# Patient Record
Sex: MALE | HISPANIC OR LATINO | Employment: OTHER | URBAN - METROPOLITAN AREA
[De-identification: names, ages, dates, MRNs, and addresses within clinical notes are randomized per-mention and may not be internally consistent; named-entity substitution may affect disease eponyms.]

---

## 2017-05-15 ENCOUNTER — OFFICE VISIT (OUTPATIENT)
Dept: RHEUMATOLOGY | Facility: PHYSICIAN GROUP | Age: 62
End: 2017-05-15
Payer: COMMERCIAL

## 2017-05-15 VITALS
TEMPERATURE: 98.6 F | DIASTOLIC BLOOD PRESSURE: 70 MMHG | OXYGEN SATURATION: 94 % | SYSTOLIC BLOOD PRESSURE: 132 MMHG | RESPIRATION RATE: 12 BRPM | BODY MASS INDEX: 37.49 KG/M2 | HEART RATE: 77 BPM | WEIGHT: 254 LBS

## 2017-05-15 DIAGNOSIS — M51.9 LUMBAR DISC DISEASE: ICD-10-CM

## 2017-05-15 DIAGNOSIS — M17.11 PRIMARY OSTEOARTHRITIS OF ONE KNEE, RIGHT: ICD-10-CM

## 2017-05-15 PROCEDURE — 20610 DRAIN/INJ JOINT/BURSA W/O US: CPT | Mod: RT | Performed by: INTERNAL MEDICINE

## 2017-05-15 PROCEDURE — 99214 OFFICE O/P EST MOD 30 MIN: CPT | Mod: 25 | Performed by: INTERNAL MEDICINE

## 2017-05-15 RX ORDER — NABUMETONE 500 MG/1
1000 TABLET, FILM COATED ORAL 2 TIMES DAILY
Qty: 120 TAB | Refills: 2 | Status: SHIPPED | OUTPATIENT
Start: 2017-05-15 | End: 2018-01-23 | Stop reason: SDUPTHER

## 2017-05-15 NOTE — Clinical Note
[facility]   [address], [address]  [place] [address]  Phone: [phone]  Fax: [phone]              Encounter Date: 5/15/2017    Dear Dr. Fajardo ref. provider found,    It was a pleasure seeing your patient, Ray Tesfaye, on 5/15/2017. Diagnoses of Primary osteoarthritis of one knee, right and Lumbar disc disease were pertinent to this visit.     Please find attached progress note which includes the history I obtained from Mr. Tesfaye, my physical examination findings, my impression and recommendations.      Once again, it was a pleasure participating in your patient's care.  Please feel free to contact me if you have any questions or if I can be of any further assistance to your patients.      Sincerely,    Emily Martinez M.D.  Electronically Signed          PROGRESS NOTE:  No notes on file

## 2017-05-15 NOTE — PROGRESS NOTES
"Chief Complaint- joint pain    Subjective:   Ray Tesfaye is a 61 y.o. male here today for follow up of rheumatological issues    This is a follow-up patient to this clinic,  previously followed by Dr Rosales  for osteoarthritis of both knees,  patient here to see if he can get a Synvisc injection in the right knee. Patient states he's had surgery on the left knee with good results and it isn't bothering him as much today. Last Synvisc one injection was July 2016 with excellent results. Comorbidities include low back pain, overweight, GERD.  Patient denies any trauma, denies any falls, denies any injuries sports, working, otherwise.    Uric acid 5.0 12/2014  CCP neg 12/2014  BILLIE neg 12/2014     Current medicines (including changes today)  Current Outpatient Prescriptions   Medication Sig Dispense Refill   • nabumetone (RELAFEN) 500 MG Tab Take 2 Tabs by mouth 2 times a day. 120 Tab 2   • hydrocodone-acetaminophen (NORCO) 5-325 MG Tab per tablet 1 tab po bid prn SEVERE pain, NO ALCOHOL and NO DRIVING  within 24 hrs of taking this medication,NO medications called \"benzodiazepines\", no selling or giving to any other persons. 60 Tab 0   • tamsulosin (FLOMAX) 0.4 MG capsule Take 0.4 mg by mouth ONE-HALF HOUR AFTER BREAKFAST.     • omeprazole (PRILOSEC) 20 MG CPDR Take 1 Cap by mouth 1 time daily as needed. 30 Cap 6   • ATORVASTATIN CALCIUM PO Take  by mouth.       No current facility-administered medications for this visit.     He  has a past medical history of Arthritis; ED (erectile dysfunction); Osteoarthritis of lumbar spine (12/27/2011); and GERD (gastroesophageal reflux disease) (12/27/2011).    ROS   Other than what is mentioned in HPI or physical exam, there is no history of headaches, double vision or blurred vision. No temporal tenderness or jaw claudication. No history of cataracts or glaucoma. No trouble swallowing difficulties or sore throats. No history of thyroid disease. No chest complaints including " chest pain, cough or sputum production. No shortness of breath. No GI complaints including nausea, vomiting, change in bowel habits, or past peptic ulcer disease. No history of blood in the stools. No urinary complaints including dysuria or frequency. No history of rash including psoriasis. No history of alopecia, photosensitivity, oral ulcerations, Raynaud's phenomena, or swollen joints. No history of gout. No back complaints. No history of low blood counts.       Objective:     Blood pressure 132/70, pulse 77, temperature 37 °C (98.6 °F), resp. rate 12, weight 115.214 kg (254 lb), SpO2 94 %. Body mass index is 37.49 kg/(m^2).   Physical Exam:  GENERAL: Alert and oriented x 3, No apparent distress. SKIN: No inflammation, normal turgor, no rashes. HEENT: Atraumatic, unremarkable. PERRLA, extraocular movements are intact. Conjunctiva clear. Fundi not examined. Temporal arteries are palpable and non-tender. THROAT: Clear. NECK: Supple with good range of motion including flexion, extension, lateral rotation and bending. No JVD, thyromegaly or adenopathy is appreciated. Carotids are palpable, no bruits. CHEST: Clear to ausculation and percussion bilaterally, no rales or rhonchi. Respiratory efforts good. BREASTS: Not examined. COR: Regular rate and rhythm. No murmurs, rubs or gallops. ABDOMEN: Soft, non-tender, non-distended. Normal active bowel sounds. No organomegaly appreciated. No hepatomegaly. EXTREMITIES: No clubbing, cyanosis, edema. MUSCULOSKELETAL: Both shoulders have full range of motion including internal and external rotation and abduction. Both elbows have full range of motion without active synovitis. The wrists have good range of motion without limitations. The small joints of the hands are unremarkable without significant swelling or tenderness. There are no tophi or nodules appreciated. Patient has significant varus deformity of both knees, bony hypertrophy bilateral knees, no erythema, no lymphangitis  The hips, ankles and feet all have good range of motion. Low back is normal. There is no SI tenderness to compression or palpation. There is good lumbar flexion. RECTAL: Not done. : Not done. NEUROLOGICAL: Grossly intact. Motor and sensory examinations are normal. PULSES. Intact    Lab Results   Component Value Date/Time    SODIUM 139 12/11/2014 04:05 PM    POTASSIUM 4.2 12/11/2014 04:05 PM    CHLORIDE 105 12/11/2014 04:05 PM    CO2 27 12/11/2014 04:05 PM    GLUCOSE 103* 12/11/2014 04:05 PM    BUN 10 12/11/2014 04:05 PM    CREATININE 0.83 12/11/2014 04:05 PM      Lab Results   Component Value Date/Time    WBC 8.8 12/11/2014 04:05 PM    RBC 4.96 12/11/2014 04:05 PM    HEMOGLOBIN 15.8 12/11/2014 04:05 PM    HEMATOCRIT 46.2 12/11/2014 04:05 PM    MCV 93.1 12/11/2014 04:05 PM    MCH 31.9 12/11/2014 04:05 PM    MCHC 34.2 12/11/2014 04:05 PM    MPV 9.4 12/11/2014 04:05 PM    NEUTROPHILS-POLYS 57.5 12/11/2014 04:05 PM    LYMPHOCYTES 34.1 12/11/2014 04:05 PM    MONOCYTES 5.9 12/11/2014 04:05 PM    EOSINOPHILS 1.5 12/11/2014 04:05 PM    BASOPHILS 0.8 12/11/2014 04:05 PM      Lab Results   Component Value Date/Time    CALCIUM 9.8 12/11/2014 04:05 PM    AST(SGOT) 18 12/11/2014 04:05 PM    ALT(SGPT) 24 12/11/2014 04:05 PM    ALKALINE PHOSPHATASE 87 12/11/2014 04:05 PM    TOTAL BILIRUBIN 0.4 12/11/2014 04:05 PM    ALBUMIN 4.7 12/11/2014 04:05 PM    TOTAL PROTEIN 7.2 12/11/2014 04:05 PM     Lab Results   Component Value Date/Time    URIC ACID 5.0 12/11/2014 04:05 PM    CCP ANTIBODIES 3 12/11/2014 04:05 PM    ANTINUCLEAR ANTIBODY None Detected 12/11/2014 04:05 PM     Lab Results   Component Value Date/Time    SED RATE WESTERGREN 7 12/11/2014 04:05 PM     Lab Results   Component Value Date/Time    GLYCOHEMOGLOBIN 5.8 05/25/2013 08:54 AM     Lab Results   Component Value Date/Time    CPK TOTAL 82 12/11/2014 04:05 PM     Lab Results   Component Value Date/Time    FLUID TYPE Synovial 12/11/2014 03:19 PM    CRYSTALS, BODY FLUID None  Seen 12/11/2014 03:19 PM     Results for orders placed in visit on 12/11/14   DX-JOINT SURVEY-HANDS SINGLE VIEW    Impression Mild osteoarthritis affecting the interphalangeal joints.     Results for orders placed in visit on 12/11/14   DX-KNEES-AP BILATERAL STANDING    Impression Bilateral osteoarthritis primarily affecting the medial compartments.     Results for orders placed in visit on 12/11/14   DX-PELVIS-1 OR 2 VIEWS    Impression Unremarkable examination of the pelvis.     Results for orders placed in visit on 12/11/14   DX-KNEES-AP BILATERAL STANDING    Impression Bilateral osteoarthritis primarily affecting the medial compartments.     Results for orders placed in visit on 12/11/14   DX-PELVIS-1 OR 2 VIEWS    Impression Unremarkable examination of the pelvis.     Results for orders placed in visit on 07/28/14   DX-KNEE COMPLETE 4+    Impression 1. Moderate osteoarthritis.    2. Moderate joint effusion. Further evaluation for internal derangement can be obtained with MRI.     Results for orders placed in visit on 03/06/14   MR-LUMBAR SPINE-W/O    Impression 1.  Mild degenerative disease in the lumbar spine as described above.  2.  There is mild central canal stenosis at L4-5. Minimal degenerative anterolisthesis is noted at this level.     Results for orders placed during the hospital encounter of 09/12/14   MR-KNEE-W/O    Impression 1.   Diffuse maceration of the medial meniscus unless the patient has had prior meniscectomy. There is a possible flap displaced from the meniscus superiorly.    2.  Severe degenerative change of the medial femorotibial articulation. There are milder degenerative changes of the lateral femorotibial and patellofemoral articulations.    3.  Joint effusion with synovitis.     Results for orders placed in visit on 10/19/13   DX-CERVICAL SPINE-2 OR 3 VIEWS    Impression Multilevel arthropathy, mainly at C5-6.  No acute findings.            INTERPRETING LOCATION: [address] [place] [address]  [place], [address]     Assessment and Plan:     1. Primary osteoarthritis of one knee, right  Today we'll do a Synvisc one injection right knee  - nabumetone (RELAFEN) 500 MG Tab; Take 2 Tabs by mouth 2 times a day.  Dispense: 120 Tab; Refill: 2  - hylan (SYNVISC-ONE) 48 MG/6ML injection 48 mg; 6 mL by Intra-articular route Once.    2. Lumbar disc disease  Currently stable at this time    Procedure: Synvisc one injection right knee    The procedure was explained to the patient in detail, all questions were answered. Risks and benefits were reviewed with patient and patient states understanding including risks of steroid injections including bleeding, infections, subcutaneous lipolysis and/or hypopigmentation at site of injection, and risk of avascular necrosis. Consent was obtained. The patient was positioned appropriately. Anatomical landmarks were identified.    Medial aspect of right knee was prepped with betadine x 3, local anesthetic with ethyl chloride and 1% Xylocaine lot #[id number], Exp July 2019 and using sterile technique,  injected 40 mg Synvisc one  Lot #[id number], Exp September 2019    EBL 0  The patient tolerated the procedure well, was observed in the office and there were no complications     Patient was asked to rest right knee for 3 days, patient states understanding and states will comply.  Approximately 30 minutes time was spent face to face with patient of which at least 50% of time was reviewing risk and benefits of procedure and the procedure in detail.      Followup: Return in about 6 months (around 11/15/2017). or sooner prn    Patient was seen 30 minutes face-to-face of which more than 50% of the time was spent counseling the patient (excluding time for procedures)  regarding  rheumatological condition and care. Therapy was discussed in detail.    Please note that this dictation was created using voice recognition software. I have made every reasonable attempt to correct obvious errors, but I expect  that there are errors of grammar and possibly content that I did not discover before finalizing the note.

## 2017-05-15 NOTE — MR AVS SNAPSHOT
Ray Tesfaye   5/15/2017 1:30 PM   Office Visit   MRN: [id number]    Department:  Rheumatology [facility]   Dept Phone:  [phone]    Description:  Male : 1955   Provider:  Emily Martinez M.D.           Reason for Visit     Follow-Up           Allergies as of 5/15/2017     No Known Allergies      You were diagnosed with     Primary osteoarthritis of one knee, right   [425726]       Lumbar disc disease   [509475]         Vital Signs     Blood Pressure Pulse Temperature Respirations Weight Oxygen Saturation    132/70 mmHg 77 37 °C (98.6 °F) 12 115.214 kg (254 lb) 94%    Smoking Status                   Former Smoker           Basic Information     Date Of Birth Sex Race Ethnicity Preferred Language    1955 Male  or   Origin (Serbian,Egyptian,Trinidadian,Elkin, etc) English      Your appointments     2017 10:00 AM   Follow Up Visit with Emily Martinez M.D.   [facility] (--)    [address] [place] [address], [address]  [facility] [id number] [phone]           You will be receiving a confirmation call a few days before your appointment from our automated call confirmation system.              Problem List              ICD-10-CM Priority Class Noted - Resolved    ED (erectile dysfunction) N52.9   2011 - Present    GERD (gastroesophageal reflux disease) K21.9   2011 - Present    Overweight E66.3   2014 - Present    Osteoarthritis M19.90   2014 - Present    Lumbar disc disease M51.9   2014 - Present    H/O arthroscopy of knee Z98.890   2014 - Present    Dyslipidemia E78.5   2014 - Present      Health Maintenance        Date Due Completion Dates    IMM DTaP/Tdap/Td Vaccine (1 - Tdap) 1976 ---    COLONOSCOPY 2007 ---            Current Immunizations     Tetanus Vaccine 2004      Below and/or attached are the medications your provider expects you to take. Review all of your home medications and newly ordered  "medications with your provider and/or pharmacist. Follow medication instructions as directed by your provider and/or pharmacist. Please keep your medication list with you and share with your provider. Update the information when medications are discontinued, doses are changed, or new medications (including over-the-counter products) are added; and carry medication information at all times in the event of emergency situations     Allergies:  No Known Allergies          Medications  Valid as of: May 15, 2017 -  2:05 PM    Generic Name Brand Name Tablet Size Instructions for use    Atorvastatin Calcium   Take  by mouth.        Hydrocodone-Acetaminophen (Tab) NORCO 5-325 MG 1 tab po bid prn SEVERE pain, NO ALCOHOL and NO DRIVING  within 24 hrs of taking this medication,NO medications called \"benzodiazepines\", no selling or giving to any other persons.        Nabumetone (Tab) RELAFEN 500 MG Take 2 Tabs by mouth 2 times a day.        Omeprazole (CAPSULE DELAYED RELEASE) PRILOSEC 20 MG Take 1 Cap by mouth 1 time daily as needed.        Tamsulosin HCl (Cap) FLOMAX 0.4 MG Take 0.4 mg by mouth ONE-HALF HOUR AFTER BREAKFAST.        .                 Medicines prescribed today were sent to:     [facility] [address] [place] [address] [place], [place] - [address] AT [facility] [employer] [address]    [address] [place] [address]    Phone: [phone] Fax: [phone]    Open 24 Hours?: No      Medication refill instructions:       If your prescription bottle indicates you have medication refills left, it is not necessary to call your provider’s office. Please contact your pharmacy and they will refill your medication.    If your prescription bottle indicates you do not have any refills left, you may request refills at any time through one of the following ways: The online HeartThis system (except Urgent Care), by calling your provider’s office, or by asking your pharmacy to contact your provider’s office with a refill request. Medication " refills are processed only during regular business hours and may not be available until the next business day. Your provider may request additional information or to have a follow-up visit with you prior to refilling your medication.   *Please Note: Medication refills are assigned a new Rx number when refilled electronically. Your pharmacy may indicate that no refills were authorized even though a new prescription for the same medication is available at the pharmacy. Please request the medicine by name with the pharmacy before contacting your provider for a refill.           [employer] Access Code: [email or web address]  Expires: 6/14/2017  2:05 PM    [employer]  A secure, online tool to manage your health information     [employer]’s [employer]® is a secure, online tool that connects you to your personalized health information from the privacy of your home -- day or night - making it very easy for you to manage your healthcare. Once the activation process is completed, you can even access your medical information using the [employer] alfredo, which is available for free in the Apple Alfredo store or Google Play store.     [employer] provides the following levels of access (as shown below):   My Chart Features   [facility] Doctor [facility]  Specialists [facility]  Urgent  Care Non-[facility]  Primary Care  Doctor   Email your healthcare team securely and privately 24/7 X X X    Manage appointments: schedule your next appointment; view details of past/upcoming appointments X      Request prescription refills. X      View recent personal medical records, including lab and immunizations X X X X   View health record, including health history, allergies, medications X X X X   Read reports about your outpatient visits, procedures, consult and ER notes X X X X   See your discharge summary, which is a recap of your hospital and/or ER visit that includes your diagnosis, lab results, and care plan. X X       How to register for [employer]  1. Go to   [email or web address].  2. Click on the Sign Up Now box, which takes you to the New Member Sign Up page. You will need to provide the following information:  a. Enter your [employer] Access Code exactly as it appears at the top of this page. (You will not need to use this code after you’ve completed the sign-up process. If you do not sign up before the expiration date, you must request a new code.)   b. Enter your date of birth.   c. Enter your home email address.   d. Click Submit, and follow the next screen’s instructions.  3. Create a [employer] ID. This will be your [employer] login ID and cannot be changed, so think of one that is secure and easy to remember.  4. Create a Message Bust password. You can change your password at any time.  5. Enter your Password Reset Question and Answer. This can be used at a later time if you forget your password.   6. Enter your e-mail address. This allows you to receive e-mail notifications when new information is available in [employer].  7. Click Sign Up. You can now view your health information.    For assistance activating your [employer] account, call [phone]

## 2017-05-24 ENCOUNTER — APPOINTMENT (OUTPATIENT)
Dept: RHEUMATOLOGY | Facility: PHYSICIAN GROUP | Age: 62
End: 2017-05-24
Payer: COMMERCIAL

## 2017-06-20 DIAGNOSIS — M51.9 LUMBAR DISC DISEASE: ICD-10-CM

## 2017-06-20 DIAGNOSIS — M17.0 PRIMARY OSTEOARTHRITIS OF BOTH KNEES: ICD-10-CM

## 2017-06-20 DIAGNOSIS — M15.9 PRIMARY OSTEOARTHRITIS INVOLVING MULTIPLE JOINTS: ICD-10-CM

## 2017-06-20 RX ORDER — HYDROCODONE BITARTRATE AND ACETAMINOPHEN 5; 325 MG/1; MG/1
TABLET ORAL
Qty: 60 TAB | Refills: 0 | Status: CANCELLED | OUTPATIENT
Start: 2017-06-20

## 2017-06-20 RX ORDER — HYDROCODONE BITARTRATE AND ACETAMINOPHEN 5; 325 MG/1; MG/1
TABLET ORAL
Qty: 60 TAB | Refills: 0 | Status: SHIPPED | OUTPATIENT
Start: 2017-06-20 | End: 2017-11-13 | Stop reason: SDUPTHER

## 2017-06-20 NOTE — TELEPHONE ENCOUNTER
Was the patient seen in the last year in this department? Yes     Does patient have an active prescription for medications requested? No     Received Request Via: Patient   Pt states that he is needing a refill for increased pain.    Pt will  when ready.  Thank you

## 2017-09-25 ENCOUNTER — OFFICE VISIT (OUTPATIENT)
Dept: URGENT CARE | Facility: CLINIC | Age: 62
End: 2017-09-25
Payer: COMMERCIAL

## 2017-09-25 VITALS
HEIGHT: 69 IN | DIASTOLIC BLOOD PRESSURE: 80 MMHG | TEMPERATURE: 98 F | WEIGHT: 255 LBS | OXYGEN SATURATION: 96 % | BODY MASS INDEX: 37.77 KG/M2 | RESPIRATION RATE: 20 BRPM | SYSTOLIC BLOOD PRESSURE: 120 MMHG | HEART RATE: 88 BPM

## 2017-09-25 DIAGNOSIS — T14.8XXA BLOOD BLISTER: ICD-10-CM

## 2017-09-25 DIAGNOSIS — B35.1 ONYCHOMYCOSIS: ICD-10-CM

## 2017-09-25 DIAGNOSIS — S90.211A CONTUSION OF RIGHT GREAT TOE WITH DAMAGE TO NAIL, INITIAL ENCOUNTER: ICD-10-CM

## 2017-09-25 DIAGNOSIS — L08.9 SKIN INFECTION: ICD-10-CM

## 2017-09-25 PROCEDURE — 99204 OFFICE O/P NEW MOD 45 MIN: CPT | Performed by: FAMILY MEDICINE

## 2017-09-25 RX ORDER — CEPHALEXIN 500 MG/1
500 CAPSULE ORAL 3 TIMES DAILY
Qty: 18 CAP | Refills: 0 | Status: SHIPPED | OUTPATIENT
Start: 2017-09-25 | End: 2017-10-01

## 2017-09-25 RX ORDER — MUPIROCIN CALCIUM 20 MG/G
CREAM TOPICAL
Qty: 1 TUBE | Refills: 0 | Status: SHIPPED | OUTPATIENT
Start: 2017-09-25

## 2017-09-25 ASSESSMENT — ENCOUNTER SYMPTOMS
BLURRED VISION: 0
WEAKNESS: 0
BRUISES/BLEEDS EASILY: 0
MYALGIAS: 0
COUGH: 0
PSYCHIATRIC NEGATIVE: 1
FALLS: 0
CHILLS: 0
DIZZINESS: 0
VOMITING: 0
HEADACHES: 0
NAUSEA: 0
FEVER: 0

## 2017-09-25 NOTE — LETTER
September 25, 2017         Patient: Ray Tesfaye   YOB: 1955   Date of Visit: 9/25/2017           To Whom it May Concern:    Ray Tesfaye was seen in my clinic on 9/25/2017. Please excuse patient from work due to injury 9/25-9/26/17  .  If you have any questions or concerns, please don't hesitate to call.        Sincerely,           Umm Clark D.O.  Electronically Signed

## 2017-09-25 NOTE — PROGRESS NOTES
"Subjective:      Ray Tesfaye is a 61 y.o. male who presents with Toe Injury (Couple days ago heavy stuff fell off over right foot , big toe swollen and apinful)    Patient was in his garage when a battery fell on his right great toe this injury occurred 4 days ago. He states that he does not feel that his toe is broken and he can move it fully can bear weight on it but he is concerned as his nail has lifted he is going to be traveling to [facility] tomorrow and did not want to get infected. He had some leftover amoxicillin she has been taking for the past 3 days and applying some topical over-the-counter antibiotic ointment. No fevers or chills no numbness tingling or weakness distally.    HPI  Review of Systems   Constitutional: Negative for chills and fever.   Eyes: Negative for blurred vision.   Respiratory: Negative for cough.    Cardiovascular: Negative for chest pain.   Gastrointestinal: Negative for nausea and vomiting.   Genitourinary: Negative for dysuria.   Musculoskeletal: Negative for falls and myalgias.   Skin: Negative for itching and rash.   Neurological: Negative for dizziness, weakness and headaches.   Endo/Heme/Allergies: Does not bruise/bleed easily.   Psychiatric/Behavioral: Negative.      PMH:  has a past medical history of Arthritis; ED (erectile dysfunction); GERD (gastroesophageal reflux disease) (12/27/2011); and Osteoarthritis of lumbar spine (12/27/2011).  MEDS:   Current Outpatient Prescriptions:   •  cephALEXin (KEFLEX) 500 MG Cap, Take 1 Cap by mouth 3 times a day for 6 days., Disp: 18 Cap, Rfl: 0  •  mupirocin calcium (BACTROBAN) 2 % Cream, Apply a small amount to affected area bid for 10 days per treatment round, Disp: 1 Tube, Rfl: 0  •  hydrocodone-acetaminophen (NORCO) 5-325 MG Tab per tablet, 1 tab po bid prn SEVERE pain, NO ALCOHOL and NO DRIVING  within 24 hrs of taking this medication,NO medications called \"benzodiazepines\", no selling or giving to any other persons., Disp: 60 " "Tab, Rfl: 0  •  nabumetone (RELAFEN) 500 MG Tab, Take 2 Tabs by mouth 2 times a day., Disp: 120 Tab, Rfl: 2  •  ATORVASTATIN CALCIUM PO, Take  by mouth., Disp: , Rfl:   •  tamsulosin (FLOMAX) 0.4 MG capsule, Take 0.4 mg by mouth ONE-HALF HOUR AFTER BREAKFAST., Disp: , Rfl:   •  omeprazole (PRILOSEC) 20 MG CPDR, Take 1 Cap by mouth 1 time daily as needed., Disp: 30 Cap, Rfl: 6  ALLERGIES: No Known Allergies  SURGHX:   Past Surgical History:   Procedure Laterality Date   • KNEE ARTHROSCOPY  11/3/2011    Performed by PRIMO ROSA at [facility]   • MEDIAL MENISCECTOMY  11/3/2011    Performed by PRIMO ROSA at [facility]   SOCHX:  reports that he quit smoking about 6 years ago. He smoked 0.25 packs per day. He does not have any smokeless tobacco history on file. He reports that he drinks alcohol. He reports that he does not use drugs.  FH: Family history was reviewed, no pertinent findings to report     Objective:     /80   Pulse 88   Temp 36.7 °C (98 °F)   Resp 20   Ht 1.753 m (5' 9\")   Wt 115.7 kg (255 lb)   SpO2 96%   BMI 37.66 kg/m²      Physical Exam   Constitutional: He is oriented to person, place, and time. He appears well-developed and well-nourished. No distress.   HENT:   Mouth/Throat: Oropharynx is clear and moist.   Eyes: Conjunctivae are normal.   Neck: Normal range of motion.   Cardiovascular: Normal rate.    Pulmonary/Chest: Effort normal.   Musculoskeletal: Normal range of motion. He exhibits edema. He exhibits no tenderness or deformity.   Neurological: He is alert and oriented to person, place, and time. He exhibits normal muscle tone. Coordination normal.   Skin: Skin is warm and dry. He is not diaphoretic. No erythema.   Psychiatric: He has a normal mood and affect. His behavior is normal.   right great toenail infected with fungus is partially anteriorly lifted but well adhered proximally. ecchymossis noted but non tender to palp. Blood blister noted " under nail    Diagnostics/therapeutics: 11 blade scalpel was used to incise and drain under lifted nail a moderate amount of serosanguinous fluid was removed  Assessment/Plan:     1. Contusion of right great toe with damage to nail, initial encounter  cephALEXin (KEFLEX) 500 MG Cap    mupirocin calcium (BACTROBAN) 2 % Cream   2. Blood blister  cephALEXin (KEFLEX) 500 MG Cap    mupirocin calcium (BACTROBAN) 2 % Cream   3. Onychomycosis     4. Skin infection       Patient declines xray today states that he can fully move toe and bear weight without difficulty

## 2017-11-13 ENCOUNTER — OFFICE VISIT (OUTPATIENT)
Dept: RHEUMATOLOGY | Facility: PHYSICIAN GROUP | Age: 62
End: 2017-11-13
Payer: COMMERCIAL

## 2017-11-13 VITALS
BODY MASS INDEX: 37.51 KG/M2 | HEART RATE: 74 BPM | RESPIRATION RATE: 14 BRPM | WEIGHT: 254 LBS | OXYGEN SATURATION: 96 % | DIASTOLIC BLOOD PRESSURE: 80 MMHG | TEMPERATURE: 97.7 F | SYSTOLIC BLOOD PRESSURE: 122 MMHG

## 2017-11-13 DIAGNOSIS — M15.9 PRIMARY OSTEOARTHRITIS INVOLVING MULTIPLE JOINTS: ICD-10-CM

## 2017-11-13 DIAGNOSIS — M17.0 PRIMARY OSTEOARTHRITIS OF BOTH KNEES: ICD-10-CM

## 2017-11-13 DIAGNOSIS — R25.2 SPASM: ICD-10-CM

## 2017-11-13 DIAGNOSIS — G89.29 CHRONIC PAIN OF RIGHT KNEE: ICD-10-CM

## 2017-11-13 DIAGNOSIS — M25.561 CHRONIC PAIN OF RIGHT KNEE: ICD-10-CM

## 2017-11-13 DIAGNOSIS — M51.9 LUMBAR DISC DISEASE: ICD-10-CM

## 2017-11-13 DIAGNOSIS — E66.3 OVERWEIGHT: ICD-10-CM

## 2017-11-13 PROCEDURE — 20610 DRAIN/INJ JOINT/BURSA W/O US: CPT | Mod: RT | Performed by: INTERNAL MEDICINE

## 2017-11-13 PROCEDURE — 99213 OFFICE O/P EST LOW 20 MIN: CPT | Mod: 25 | Performed by: INTERNAL MEDICINE

## 2017-11-13 RX ORDER — TIZANIDINE 4 MG/1
TABLET ORAL
Qty: 30 TAB | Refills: 1 | Status: SHIPPED | OUTPATIENT
Start: 2017-11-13 | End: 2018-01-22 | Stop reason: SDUPTHER

## 2017-11-13 RX ORDER — HYDROCODONE BITARTRATE AND ACETAMINOPHEN 5; 325 MG/1; MG/1
TABLET ORAL
Qty: 60 TAB | Refills: 0 | Status: SHIPPED | OUTPATIENT
Start: 2017-11-13 | End: 2018-11-13

## 2017-11-13 NOTE — PROGRESS NOTES
Chief Complaint- joint pain    Subjective:   Ray Tesfaye is a 61 y.o. male here today for follow up of rheumatological issues    This is a follow-up patient to this clinic,  previously followed by Dr Rosales  for osteoarthritis of both knees,  patient here to see if he can get a Synvisc injection in the right knee. Patient states he's had surgery on the left knee with good results and it isn't bothering him as much. Last Synvisc one injection was May 15, 2017 which lasted for about a couple months. Patient recently saw Dr. Collazo who did x-rays and indicated bone-on-bone arthritis in the right knee. Patient wonders about referral to orthopedics for a surgical evaluation. Comorbidities include low back pain, overweight, GERD.  Patient denies any trauma, denies any falls, denies any injuries sports, working, otherwise.     Uric acid 5.0 12/2014  CCP neg 12/2014  BILLIE neg 12/2014      Current medicines (including changes today)  Current Outpatient Prescriptions   Medication Sig Dispense Refill   • hydrocodone-acetaminophen (NORCO) 5-325 MG Tab per tablet 1 tab po bid prn SEVERE pain, NO ALCOHOL , NO DRIVING and NO MARIJUANA  within 24 hrs of taking this medication,NO BENZODIAZEPINE medications, no selling or giving to any other persons. 60 Tab 0   • tizanidine (ZANAFLEX) 4 MG Tab 1 tab po qhs prn muscle spasm no driving on this medication 30 Tab 1   • nabumetone (RELAFEN) 500 MG Tab Take 2 Tabs by mouth 2 times a day. 120 Tab 2   • tamsulosin (FLOMAX) 0.4 MG capsule Take 0.4 mg by mouth ONE-HALF HOUR AFTER BREAKFAST.     • mupirocin calcium (BACTROBAN) 2 % Cream Apply a small amount to affected area bid for 10 days per treatment round 1 Tube 0   • ATORVASTATIN CALCIUM PO Take  by mouth.     • omeprazole (PRILOSEC) 20 MG CPDR Take 1 Cap by mouth 1 time daily as needed. 30 Cap 6     No current facility-administered medications for this visit.      He  has a past medical history of Arthritis; ED (erectile  dysfunction); GERD (gastroesophageal reflux disease) (12/27/2011); and Osteoarthritis of lumbar spine (12/27/2011).    ROS   Other than what is mentioned in HPI or physical exam, there is no history of headaches, double vision or blurred vision. No temporal tenderness or jaw claudication. No history of cataracts or glaucoma. No trouble swallowing difficulties or sore throats. No history of thyroid disease. No chest complaints including chest pain, cough or sputum production. No shortness of breath. No GI complaints including nausea, vomiting, change in bowel habits, or past peptic ulcer disease. No history of blood in the stools. No urinary complaints including dysuria or frequency. No history of rash including psoriasis. No history of alopecia, photosensitivity, oral ulcerations, Raynaud's phenomena, or swollen joints. No history of gout. No back complaints. No history of low blood counts.       Objective:     Blood pressure 122/80, pulse 74, temperature 36.5 °C (97.7 °F), resp. rate 14, weight 115.2 kg (254 lb), SpO2 96 %. Body mass index is 37.51 kg/m².   Physical Exam:  GENERAL: Alert and oriented x 3, No apparent distress. SKIN: No inflammation, normal turgor, no rashes. HEENT: Atraumatic, unremarkable. PERRLA, extraocular movements are intact. Conjunctiva clear. Fundi not examined. Temporal arteries are palpable and non-tender. THROAT: Clear. NECK: Supple with good range of motion including flexion, extension, lateral rotation and bending. No JVD, thyromegaly or adenopathy is appreciated. Carotids are palpable, no bruits. CHEST: Clear to ausculation and percussion bilaterally, no rales or rhonchi. Respiratory efforts good. BREASTS: Not examined. COR: Regular rate and rhythm. No murmurs, rubs or gallops. ABDOMEN: Soft, non-tender, non-distended. Normal active bowel sounds. No organomegaly appreciated. No hepatomegaly. EXTREMITIES: No clubbing, cyanosis, edema. MUSCULOSKELETAL: Both shoulders have full range of  motion including internal and external rotation and abduction. Both elbows have full range of motion without active synovitis. The wrists have good range of motion without limitations. The small joints of the hands are unremarkable without significant swelling or tenderness. There are no tophi or nodules appreciated. Patient has significant varus deformity of both knees, bony hypertrophy bilateral knees, no erythema, no lymphangitis The hips, ankles and feet all have good range of motion. Low back is normal. There is no SI tenderness to compression or palpation. There is good lumbar flexion. RECTAL: Not done. : Not done. NEUROLOGICAL: Grossly intact. Motor and sensory examinations are normal. PULSES. Intact  Lab Results   Component Value Date/Time    SODIUM 139 12/11/2014 04:05 PM    POTASSIUM 4.2 12/11/2014 04:05 PM    CHLORIDE 105 12/11/2014 04:05 PM    CO2 27 12/11/2014 04:05 PM    GLUCOSE 103 (H) 12/11/2014 04:05 PM    BUN 10 12/11/2014 04:05 PM    CREATININE 0.83 12/11/2014 04:05 PM      Lab Results   Component Value Date/Time    WBC 8.8 12/11/2014 04:05 PM    RBC 4.96 12/11/2014 04:05 PM    HEMOGLOBIN 15.8 12/11/2014 04:05 PM    HEMATOCRIT 46.2 12/11/2014 04:05 PM    MCV 93.1 12/11/2014 04:05 PM    MCH 31.9 12/11/2014 04:05 PM    MCHC 34.2 12/11/2014 04:05 PM    MPV 9.4 12/11/2014 04:05 PM    NEUTSPOLYS 57.5 12/11/2014 04:05 PM    LYMPHOCYTES 34.1 12/11/2014 04:05 PM    MONOCYTES 5.9 12/11/2014 04:05 PM    EOSINOPHILS 1.5 12/11/2014 04:05 PM    BASOPHILS 0.8 12/11/2014 04:05 PM      Lab Results   Component Value Date/Time    CALCIUM 9.8 12/11/2014 04:05 PM    ASTSGOT 18 12/11/2014 04:05 PM    ALTSGPT 24 12/11/2014 04:05 PM    ALKPHOSPHAT 87 12/11/2014 04:05 PM    TBILIRUBIN 0.4 12/11/2014 04:05 PM    ALBUMIN 4.7 12/11/2014 04:05 PM    TOTPROTEIN 7.2 12/11/2014 04:05 PM     Lab Results   Component Value Date/Time    URICACID 5.0 12/11/2014 04:05 PM    CCPANTIBODY 3 12/11/2014 04:05 PM    ANTINUCAB None  Detected 12/11/2014 04:05 PM     Lab Results   Component Value Date/Time    SEDRATEWES 7 12/11/2014 04:05 PM     Lab Results   Component Value Date/Time    HBA1C 5.8 05/25/2013 08:54 AM     Lab Results   Component Value Date/Time    CPKTOTAL 82 12/11/2014 04:05 PM     Lab Results   Component Value Date/Time    FLTYPE Synovial 12/11/2014 03:19 PM    CRYSTALSBDF None Seen 12/11/2014 03:19 PM     Results for orders placed in visit on 12/11/14   DX-JOINT SURVEY-HANDS SINGLE VIEW    Impression Mild osteoarthritis affecting the interphalangeal joints.     Results for orders placed in visit on 12/11/14   DX-KNEES-AP BILATERAL STANDING    Impression Bilateral osteoarthritis primarily affecting the medial compartments.     Results for orders placed in visit on 12/11/14   DX-PELVIS-1 OR 2 VIEWS    Impression Unremarkable examination of the pelvis.     Results for orders placed in visit on 12/11/14   DX-KNEES-AP BILATERAL STANDING    Impression Bilateral osteoarthritis primarily affecting the medial compartments.     Results for orders placed in visit on 12/11/14   DX-PELVIS-1 OR 2 VIEWS    Impression Unremarkable examination of the pelvis.     Results for orders placed in visit on 07/28/14   DX-KNEE COMPLETE 4+    Impression 1. Moderate osteoarthritis.    2. Moderate joint effusion. Further evaluation for internal derangement can be obtained with MRI.     MR-LUMBAR SPINE-W/O    Impression 1.  Mild degenerative disease in the lumbar spine as described above.  2.  There is mild central canal stenosis at L4-5. Minimal degenerative anterolisthesis is noted at this level.     Results for orders placed during the hospital encounter of 09/12/14   MR-KNEE-W/O    Impression 1.   Diffuse maceration of the medial meniscus unless the patient has had prior meniscectomy. There is a possible flap displaced from the meniscus superiorly.    2.  Severe degenerative change of the medial femorotibial articulation. There are milder degenerative  changes of the lateral femorotibial and patellofemoral articulations.    3.  Joint effusion with synovitis.     Results for orders placed in visit on 10/19/13   DX-CERVICAL SPINE-2 OR 3 VIEWS    Impression Multilevel arthropathy, mainly at C5-6.  No acute findings.            INTERPRETING LOCATION: [address] [place] [address] [place], [address]       Assessment and Plan:     1. Primary osteoarthritis involving multiple joints  Today we'll do a Synvisc injection of the right knee and submitted a referral to orthopedics for evaluation of possible TKA  - REFERRAL TO ORTHOPEDICS  - hydrocodone-acetaminophen (NORCO) 5-325 MG Tab per tablet; 1 tab po bid prn SEVERE pain, NO ALCOHOL , NO DRIVING and NO MARIJUANA  within 24 hrs of taking this medication,NO BENZODIAZEPINE medications, no selling or giving to any other persons.  Dispense: 60 Tab; Refill: 0  - tizanidine (ZANAFLEX) 4 MG Tab; 1 tab po qhs prn muscle spasm no driving on this medication  Dispense: 30 Tab; Refill: 1  - hylan (SYNVISC-ONE) 48 MG/6ML injection 48 mg; 6 mL by Intra-articular route Once.    2. Chronic pain of right knee  Synvisc injection today  - REFERRAL TO ORTHOPEDICS  - tizanidine (ZANAFLEX) 4 MG Tab; 1 tab po qhs prn muscle spasm no driving on this medication  Dispense: 30 Tab; Refill: 1  - hylan (SYNVISC-ONE) 48 MG/6ML injection 48 mg; 6 mL by Intra-articular route Once.    3. Spasm  Patient complains of spasms in his legs at night will do a trial tizanidine  - tizanidine (ZANAFLEX) 4 MG Tab; 1 tab po qhs prn muscle spasm no driving on this medication  Dispense: 30 Tab; Refill: 1    4. Lumbar disc disease  Chronic problem for patient, stable  - REFERRAL TO ORTHOPEDICS  - hydrocodone-acetaminophen (NORCO) 5-325 MG Tab per tablet; 1 tab po bid prn SEVERE pain, NO ALCOHOL , NO DRIVING and NO MARIJUANA  within 24 hrs of taking this medication,NO BENZODIAZEPINE medications, no selling or giving to any other persons.  Dispense: 60 Tab; Refill: 0    5.  Overweight  Exacerbates the patient's arthritis, recommend to lose weight, patient states understanding    Followup: Return in about 6 months (around 5/13/2018). or sooner prn    Procedure: Right knee Synvisc one injection  Separate and distinct from conservative care visit as focus of visit today, patient was noticed to have right knee pain and was offered a Synvisc injection in the right knee and patient requested and was agreeable to procedure.    The procedure was explained to the patient in detail, all questions were answered. Risks and benefits were reviewed with patient and patient states understanding including risks of steroid injections including bleeding, infections, subcutaneous lipolysis and/or hypopigmentation at site of injection, and risk of avascular necrosis. Consent was obtained. The patient was positioned appropriately. Anatomical landmarks were identified.    Medial aspect of right knee was prepped with betadine x 3, local anesthetic with ethyl chloride and 2% Xylocaine lot #[id number], Exp January 2021 and using sterile technique,  injected unit dose/40 mg of Synvisc One lot number [id number]  Expires December 2019    EBL 0  The patient tolerated the procedure well, was observed in the office and there were no complications     Patient was asked to rest right knee for 3 days, patient states understanding and states will comply.  Approximately 30 minutes time was spent face to face with patient of which at least 50% of time was reviewing risk and benefits of procedure and the procedure in detail.      Patient was seen 30 minutes face-to-face of which more than 50% of the time was spent counseling the patient (excluding time for procedures)  regarding  rheumatological condition and care. Therapy was discussed in detail.    Please note that this dictation was created using voice recognition software. I have made every reasonable attempt to correct obvious errors, but I expect that there are errors of  grammar and possibly content that I did not discover before finalizing the note.

## 2017-11-20 DIAGNOSIS — M51.9 LUMBAR DISC DISEASE: ICD-10-CM

## 2017-11-20 DIAGNOSIS — M17.0 PRIMARY OSTEOARTHRITIS OF BOTH KNEES: ICD-10-CM

## 2017-11-20 DIAGNOSIS — M15.9 PRIMARY OSTEOARTHRITIS INVOLVING MULTIPLE JOINTS: ICD-10-CM

## 2018-05-15 ENCOUNTER — APPOINTMENT (OUTPATIENT)
Dept: RHEUMATOLOGY | Facility: PHYSICIAN GROUP | Age: 63
End: 2018-05-15
Payer: COMMERCIAL

## 2018-06-07 ENCOUNTER — HOSPITAL ENCOUNTER (OUTPATIENT)
Dept: LAB | Facility: MEDICAL CENTER | Age: 63
End: 2018-06-07
Attending: INTERNAL MEDICINE
Payer: COMMERCIAL

## 2018-06-07 ENCOUNTER — OFFICE VISIT (OUTPATIENT)
Dept: RHEUMATOLOGY | Facility: PHYSICIAN GROUP | Age: 63
End: 2018-06-07
Payer: COMMERCIAL

## 2018-06-07 VITALS
OXYGEN SATURATION: 94 % | SYSTOLIC BLOOD PRESSURE: 118 MMHG | RESPIRATION RATE: 14 BRPM | DIASTOLIC BLOOD PRESSURE: 70 MMHG | TEMPERATURE: 97.3 F | WEIGHT: 255 LBS | BODY MASS INDEX: 37.66 KG/M2 | HEART RATE: 72 BPM

## 2018-06-07 DIAGNOSIS — M15.9 PRIMARY OSTEOARTHRITIS INVOLVING MULTIPLE JOINTS: ICD-10-CM

## 2018-06-07 DIAGNOSIS — Z79.1 NSAID LONG-TERM USE: ICD-10-CM

## 2018-06-07 DIAGNOSIS — G89.29 CHRONIC PAIN OF BOTH KNEES: ICD-10-CM

## 2018-06-07 DIAGNOSIS — M25.561 CHRONIC PAIN OF BOTH KNEES: ICD-10-CM

## 2018-06-07 DIAGNOSIS — M25.562 CHRONIC PAIN OF BOTH KNEES: ICD-10-CM

## 2018-06-07 DIAGNOSIS — M51.9 LUMBAR DISC DISEASE: ICD-10-CM

## 2018-06-07 DIAGNOSIS — E66.3 OVERWEIGHT: ICD-10-CM

## 2018-06-07 LAB
ALBUMIN SERPL BCP-MCNC: 4.5 G/DL (ref 3.2–4.9)
ALBUMIN/GLOB SERPL: 1.7 G/DL
ALP SERPL-CCNC: 87 U/L (ref 30–99)
ALT SERPL-CCNC: 20 U/L (ref 2–50)
ANION GAP SERPL CALC-SCNC: 7 MMOL/L (ref 0–11.9)
AST SERPL-CCNC: 20 U/L (ref 12–45)
BASOPHILS # BLD AUTO: 0.4 % (ref 0–1.8)
BASOPHILS # BLD: 0.04 K/UL (ref 0–0.12)
BILIRUB SERPL-MCNC: 0.6 MG/DL (ref 0.1–1.5)
BUN SERPL-MCNC: 11 MG/DL (ref 8–22)
CALCIUM SERPL-MCNC: 9.5 MG/DL (ref 8.5–10.5)
CHLORIDE SERPL-SCNC: 105 MMOL/L (ref 96–112)
CO2 SERPL-SCNC: 26 MMOL/L (ref 20–33)
CREAT SERPL-MCNC: 0.87 MG/DL (ref 0.5–1.4)
EOSINOPHIL # BLD AUTO: 0.15 K/UL (ref 0–0.51)
EOSINOPHIL NFR BLD: 1.6 % (ref 0–6.9)
ERYTHROCYTE [DISTWIDTH] IN BLOOD BY AUTOMATED COUNT: 44.3 FL (ref 35.9–50)
GLOBULIN SER CALC-MCNC: 2.6 G/DL (ref 1.9–3.5)
GLUCOSE SERPL-MCNC: 102 MG/DL (ref 65–99)
HCT VFR BLD AUTO: 47.7 % (ref 42–52)
HGB BLD-MCNC: 16.1 G/DL (ref 14–18)
IMM GRANULOCYTES # BLD AUTO: 0.03 K/UL (ref 0–0.11)
IMM GRANULOCYTES NFR BLD AUTO: 0.3 % (ref 0–0.9)
LYMPHOCYTES # BLD AUTO: 3.1 K/UL (ref 1–4.8)
LYMPHOCYTES NFR BLD: 32.3 % (ref 22–41)
MCH RBC QN AUTO: 31.8 PG (ref 27–33)
MCHC RBC AUTO-ENTMCNC: 33.8 G/DL (ref 33.7–35.3)
MCV RBC AUTO: 94.1 FL (ref 81.4–97.8)
MONOCYTES # BLD AUTO: 0.62 K/UL (ref 0–0.85)
MONOCYTES NFR BLD AUTO: 6.5 % (ref 0–13.4)
NEUTROPHILS # BLD AUTO: 5.66 K/UL (ref 1.82–7.42)
NEUTROPHILS NFR BLD: 58.9 % (ref 44–72)
NRBC # BLD AUTO: 0 K/UL
NRBC BLD-RTO: 0 /100 WBC
PLATELET # BLD AUTO: 258 K/UL (ref 164–446)
PMV BLD AUTO: 9.6 FL (ref 9–12.9)
POTASSIUM SERPL-SCNC: 4.6 MMOL/L (ref 3.6–5.5)
PROT SERPL-MCNC: 7.1 G/DL (ref 6–8.2)
RBC # BLD AUTO: 5.07 M/UL (ref 4.7–6.1)
SODIUM SERPL-SCNC: 138 MMOL/L (ref 135–145)
WBC # BLD AUTO: 9.6 K/UL (ref 4.8–10.8)

## 2018-06-07 PROCEDURE — 99213 OFFICE O/P EST LOW 20 MIN: CPT | Mod: 25 | Performed by: INTERNAL MEDICINE

## 2018-06-07 PROCEDURE — 36415 COLL VENOUS BLD VENIPUNCTURE: CPT

## 2018-06-07 PROCEDURE — 20610 DRAIN/INJ JOINT/BURSA W/O US: CPT | Mod: 50 | Performed by: INTERNAL MEDICINE

## 2018-06-07 PROCEDURE — 80053 COMPREHEN METABOLIC PANEL: CPT

## 2018-06-07 PROCEDURE — 85025 COMPLETE CBC W/AUTO DIFF WBC: CPT

## 2018-06-07 RX ORDER — METHYLPREDNISOLONE ACETATE 40 MG/ML
40 INJECTION, SUSPENSION INTRA-ARTICULAR; INTRALESIONAL; INTRAMUSCULAR; SOFT TISSUE ONCE
Status: COMPLETED | OUTPATIENT
Start: 2018-06-07 | End: 2018-06-07

## 2018-06-07 RX ADMIN — METHYLPREDNISOLONE ACETATE 30 MG: 40 INJECTION, SUSPENSION INTRA-ARTICULAR; INTRALESIONAL; INTRAMUSCULAR; SOFT TISSUE at 09:26

## 2018-06-07 NOTE — LETTER
June 7, 2018        Ray Tesfaye      To Whom it may Concern,  The above named patient needs to rest his knees through 6/10/2018.        Emily Martinez

## 2018-06-07 NOTE — LETTER
[facility]   [address], [address]  [place] [address]  Phone: [phone]  Fax: [phone]              Encounter Date: 6/7/2018    Dear Dr. Fajardo ref. provider found,    It was a pleasure seeing your patient, Ray Tesfaye, on 6/7/2018. Diagnoses of Primary osteoarthritis involving multiple joints, Chronic pain of both knees, NSAID long-term use, Lumbar disc disease, and Overweight were pertinent to this visit.     Please find attached progress note which includes the history I obtained from Mr. Tesfaye, my physical examination findings, my impression and recommendations.      Once again, it was a pleasure participating in your patient's care.  Please feel free to contact me if you have any questions or if I can be of any further assistance to your patients.      Sincerely,    Emily Martinez M.D.  Electronically Signed          PROGRESS NOTE:  No notes on file

## 2018-06-18 DIAGNOSIS — M17.11 PRIMARY OSTEOARTHRITIS OF ONE KNEE, RIGHT: ICD-10-CM

## 2018-06-18 RX ORDER — NABUMETONE 500 MG/1
TABLET, FILM COATED ORAL
Qty: 180 TAB | Refills: 1 | Status: SHIPPED | OUTPATIENT
Start: 2018-06-18 | End: 2018-07-10 | Stop reason: SDUPTHER

## 2018-06-18 NOTE — TELEPHONE ENCOUNTER
Was the patient seen in the last year in this department? Yes    [facility]  Does patient have an active prescription for medications requested? No     Received Request Via: Pharmacy

## 2018-07-10 DIAGNOSIS — M17.11 PRIMARY OSTEOARTHRITIS OF ONE KNEE, RIGHT: ICD-10-CM

## 2018-07-10 RX ORDER — NABUMETONE 500 MG/1
TABLET, FILM COATED ORAL
Qty: 180 TAB | Refills: 1 | Status: SHIPPED | OUTPATIENT
Start: 2018-07-10 | End: 2018-11-13

## 2018-07-10 NOTE — TELEPHONE ENCOUNTER
Was the patient seen in the last year in this department? Yes  [facility]    Does patient have an active prescription for medications requested? No     Received Request Via: Pharmacy

## 2018-07-18 ENCOUNTER — APPOINTMENT (OUTPATIENT)
Dept: RHEUMATOLOGY | Facility: PHYSICIAN GROUP | Age: 63
End: 2018-07-18
Payer: COMMERCIAL

## 2018-11-05 ENCOUNTER — APPOINTMENT (OUTPATIENT)
Dept: RHEUMATOLOGY | Facility: MEDICAL CENTER | Age: 63
End: 2018-11-05
Payer: COMMERCIAL

## 2018-11-13 ENCOUNTER — OFFICE VISIT (OUTPATIENT)
Dept: RHEUMATOLOGY | Facility: MEDICAL CENTER | Age: 63
End: 2018-11-13
Payer: COMMERCIAL

## 2018-11-13 VITALS
HEART RATE: 92 BPM | BODY MASS INDEX: 37.8 KG/M2 | SYSTOLIC BLOOD PRESSURE: 128 MMHG | OXYGEN SATURATION: 96 % | TEMPERATURE: 97.5 F | DIASTOLIC BLOOD PRESSURE: 78 MMHG | WEIGHT: 256 LBS | RESPIRATION RATE: 14 BRPM

## 2018-11-13 DIAGNOSIS — Z78.9 ALCOHOL USE: ICD-10-CM

## 2018-11-13 DIAGNOSIS — Z72.0 TOBACCO ABUSE: ICD-10-CM

## 2018-11-13 DIAGNOSIS — M15.9 PRIMARY OSTEOARTHRITIS INVOLVING MULTIPLE JOINTS: ICD-10-CM

## 2018-11-13 DIAGNOSIS — Z79.1 NSAID LONG-TERM USE: ICD-10-CM

## 2018-11-13 DIAGNOSIS — M51.9 LUMBAR DISC DISEASE: ICD-10-CM

## 2018-11-13 PROCEDURE — 99213 OFFICE O/P EST LOW 20 MIN: CPT | Mod: 25 | Performed by: INTERNAL MEDICINE

## 2018-11-13 PROCEDURE — 20610 DRAIN/INJ JOINT/BURSA W/O US: CPT | Mod: 50 | Performed by: INTERNAL MEDICINE

## 2018-11-13 RX ORDER — METHYLPREDNISOLONE ACETATE 40 MG/ML
40 INJECTION, SUSPENSION INTRA-ARTICULAR; INTRALESIONAL; INTRAMUSCULAR; SOFT TISSUE ONCE
Status: COMPLETED | OUTPATIENT
Start: 2018-11-13 | End: 2018-11-13

## 2018-11-13 RX ORDER — CELECOXIB 200 MG/1
CAPSULE ORAL
Qty: 180 CAP | Refills: 0 | Status: SHIPPED | OUTPATIENT
Start: 2018-11-13 | End: 2023-08-24

## 2018-11-13 RX ADMIN — METHYLPREDNISOLONE ACETATE 40 MG: 40 INJECTION, SUSPENSION INTRA-ARTICULAR; INTRALESIONAL; INTRAMUSCULAR; SOFT TISSUE at 16:22

## 2018-11-13 NOTE — LETTER
November 13, 2018        Ray Tesfaye    To Whom it may Concern,  Please excuse this patient from work until 11/19/2018.    Emily Martinez MD FACR

## 2018-11-13 NOTE — LETTER
[facility]   [address], [address]  [place] [address]  Phone: [phone]  Fax: [phone]              Encounter Date: 11/13/2018    Dear Dr. Fajardo ref. provider found,    It was a pleasure seeing your patient, Ray Tesfaye, on 11/13/2018. Diagnoses of Primary osteoarthritis involving multiple joints, NSAID long-term use, Lumbar disc disease, Tobacco abuse, and Alcohol use were pertinent to this visit.     Please find attached progress note which includes the history I obtained from Mr. Tesfaye, my physical examination findings, my impression and recommendations.      Once again, it was a pleasure participating in your patient's care.  Please feel free to contact me if you have any questions or if I can be of any further assistance to your patients.      Sincerely,    Emily Martinez M.D.  Electronically Signed          PROGRESS NOTE:  No notes on file

## 2018-11-14 NOTE — PROGRESS NOTES
Chief Complaint- joint pain    Subjective:   Ray Tesfaye is a 62 y.o. male here today for follow up of rheumatological issues    This is a follow-up visit for this patient who we see for osteoarthritis of bilateral knees, patient is here to see about getting another corticosteroid injection patient last corticosteroid injections were June 2018.  Patient states corticosteroid injections have worn off.  Patient's been told he needs knee replacement for which she is not interested in pursuing.  Patient has not lost any weight as well.  Patient currently takes nabumetone which does help somewhat.   Patient denies any side effects from the medication, denies any unexplained weight loss, denies any fevers of unknown etiology, denies any GI upset, denies any rashes, denies any new joint swelling, denies recurrent infections.     Of note patient's wondering about disability     Comorbidities include GERD, chronic low back pain and obesity.          Uric acid 5.0 12/2014  CCP neg 12/2014  BILLIE neg 12/2014    Hand x-rays 12/2014-mild osteoarthritis affecting the interphalangeal joints  Knee x-rays 12/2014-indicates bilateral osteoarthritis primarily affecting the medial compartment  Left Knee MRI 10/2011-indicates complex medial meniscal tear and associated DJD, severe medial compartment osteoarthritis  C-spine x-ray 10/2013-indicates multilevel arthropathy mainly at C5/6, significant bilateral uncinate spurring  LS spine x-ray 10/2013-indicates facet arthropathy      Current medicines (including changes today)  Current Outpatient Prescriptions   Medication Sig Dispense Refill   • celecoxib (CELEBREX) 200 MG Cap 1 tab po bid prn joint pain 180 Cap 0   • tizanidine (ZANAFLEX) 4 MG Tab TAKE 1 TABLET BY MOUTH ONCE DAILY AT BEDTIME AS NEEDED FOR MUSCLE SPASMS(NO DRIVING ON THIS MEDICATION) (Patient not taking: Reported on 11/13/2018) 30 Tab 2   • mupirocin calcium (BACTROBAN) 2 % Cream Apply a small amount to affected area bid  for 10 days per treatment round 1 Tube 0   • ATORVASTATIN CALCIUM PO Take  by mouth.     • tamsulosin (FLOMAX) 0.4 MG capsule Take 0.4 mg by mouth ONE-HALF HOUR AFTER BREAKFAST.     • omeprazole (PRILOSEC) 20 MG CPDR Take 1 Cap by mouth 1 time daily as needed. (Patient not taking: Reported on 11/13/2018) 30 Cap 6     No current facility-administered medications for this visit.      He  has a past medical history of Arthritis; ED (erectile dysfunction); GERD (gastroesophageal reflux disease) (12/27/2011); and Osteoarthritis of lumbar spine (12/27/2011).    ROS   Other than what is mentioned in HPI or physical exam, there is no history of headaches, double vision or blurred vision. No temporal tenderness or jaw claudication. No history of cataracts or glaucoma. No trouble swallowing difficulties or sore throats.  No chest complaints including chest pain, cough or sputum production. No GI complaints including nausea, vomiting, change in bowel habits, or past peptic ulcer disease. No history of blood in the stools. No urinary complaints including dysuria or frequency. No history of alopecia, photosensitivity, oral ulcerations, Raynaud's phenomena.       Objective:     Blood pressure 128/78, pulse 92, temperature 36.4 °C (97.5 °F), temperature source Temporal, resp. rate 14, weight 116.1 kg (256 lb), SpO2 96 %. Body mass index is 37.8 kg/m².   Physical Exam:    Constitutional: Alert and oriented X3, patient is talkative with good eye contact, daughter with patient today.Skin: Warm, dry, good turgor, no rashes in visible areas, dry skin on knees but no genny psoriatic plaques.Eye: Equal, round and reactive, conjunctiva clear, lids normal EOM intactENMT: Lips without lesions, good dentition, no oropharyngeal ulcers, moist buccal mucosa, pinna without deformityNeck: Trachea midline, no masses, no thyromegaly.Lymph:  No cervical lymphadenopathy, no axillary lymphadenopathy, no supraclavicular lymphadenopathyRespiratory:  Unlabored respiratory effort, lungs clear to auscultation, no wheezes, no ronchi.Cardiovascular: Normal S1, S2, no murmur, no edema.Abdomen: Soft, non-tender, no masses, no hepatosplenomegaly, patient was central obesity.Psych: Alert and oriented x3, normal affect and mood.Neuro: Cranial nerves 2-12 are grossly intact, no loss of sensation LEExt:no joint laxity noted in bilateral arms, no joint laxity noted in bilateral legs, evidence of varus deformity both knees, tenderness palpation along bilateral knee joint line, no sausage digits no dactylitis did not appreciate any Heberden's nodes or Tye's nodes, no deformities of the small joints of the hands of the toes    Lab Results   Component Value Date/Time    SODIUM 138 06/07/2018 10:25 AM    POTASSIUM 4.6 06/07/2018 10:25 AM    CHLORIDE 105 06/07/2018 10:25 AM    CO2 26 06/07/2018 10:25 AM    GLUCOSE 102 (H) 06/07/2018 10:25 AM    BUN 11 06/07/2018 10:25 AM    CREATININE 0.87 06/07/2018 10:25 AM      Lab Results   Component Value Date/Time    WBC 9.6 06/07/2018 10:25 AM    RBC 5.07 06/07/2018 10:25 AM    HEMOGLOBIN 16.1 06/07/2018 10:25 AM    HEMATOCRIT 47.7 06/07/2018 10:25 AM    MCV 94.1 06/07/2018 10:25 AM    MCH 31.8 06/07/2018 10:25 AM    MCHC 33.8 06/07/2018 10:25 AM    MPV 9.6 06/07/2018 10:25 AM    NEUTSPOLYS 58.90 06/07/2018 10:25 AM    LYMPHOCYTES 32.30 06/07/2018 10:25 AM    MONOCYTES 6.50 06/07/2018 10:25 AM    EOSINOPHILS 1.60 06/07/2018 10:25 AM    BASOPHILS 0.40 06/07/2018 10:25 AM      Lab Results   Component Value Date/Time    CALCIUM 9.5 06/07/2018 10:25 AM    ASTSGOT 20 06/07/2018 10:25 AM    ALTSGPT 20 06/07/2018 10:25 AM    ALKPHOSPHAT 87 06/07/2018 10:25 AM    TBILIRUBIN 0.6 06/07/2018 10:25 AM    ALBUMIN 4.5 06/07/2018 10:25 AM    TOTPROTEIN 7.1 06/07/2018 10:25 AM     Lab Results   Component Value Date/Time    URICACID 5.0 12/11/2014 04:05 PM    CCPANTIBODY 3 12/11/2014 04:05 PM    ANTINUCAB None Detected 12/11/2014 04:05 PM     Lab  Results   Component Value Date/Time    SEDRATEWES 7 12/11/2014 04:05 PM     Lab Results   Component Value Date/Time    HBA1C 5.8 05/25/2013 08:54 AM     Lab Results   Component Value Date/Time    CPKTOTAL 82 12/11/2014 04:05 PM     Lab Results   Component Value Date/Time    FLTYPE Synovial 12/11/2014 03:19 PM    CRYSTALSBDF None Seen 12/11/2014 03:19 PM     Results for orders placed in visit on 12/11/14   DX-JOINT SURVEY-HANDS SINGLE VIEW    Impression Mild osteoarthritis affecting the interphalangeal joints.     Results for orders placed in visit on 12/11/14   DX-KNEES-AP BILATERAL STANDING    Impression Bilateral osteoarthritis primarily affecting the medial compartments.     Results for orders placed in visit on 12/11/14   DX-PELVIS-1 OR 2 VIEWS    Impression Unremarkable examination of the pelvis.     Results for orders placed in visit on 12/11/14   DX-KNEES-AP BILATERAL STANDING    Impression Bilateral osteoarthritis primarily affecting the medial compartments.     Results for orders placed in visit on 12/11/14   DX-PELVIS-1 OR 2 VIEWS    Impression Unremarkable examination of the pelvis.     Results for orders placed in visit on 07/28/14   DX-KNEE COMPLETE 4+    Impression 1. Moderate osteoarthritis.    2. Moderate joint effusion. Further evaluation for internal derangement can be obtained with MRI.     Results for orders placed in visit on 03/06/14   MR-LUMBAR SPINE-W/O    Impression 1.  Mild degenerative disease in the lumbar spine as described above.  2.  There is mild central canal stenosis at L4-5. Minimal degenerative anterolisthesis is noted at this level.     Results for orders placed during the hospital encounter of 09/12/14   MR-KNEE-W/O    Impression 1.   Diffuse maceration of the medial meniscus unless the patient has had prior meniscectomy. There is a possible flap displaced from the meniscus superiorly.    2.  Severe degenerative change of the medial femorotibial articulation. There are milder  degenerative changes of the lateral femorotibial and patellofemoral articulations.    3.  Joint effusion with synovitis.     Results for orders placed in visit on 10/19/13   DX-CERVICAL SPINE-2 OR 3 VIEWS    Impression Multilevel arthropathy, mainly at C5-6.  No acute findings.            INTERPRETING LOCATION: [address] [place] [address], [id number]     Assessment and Plan:     1. Primary osteoarthritis involving multiple joints  As seen on x-rays C-spine lumbar spine knee x-rays hand x-rays, status post trial nabumetone will do a trial of Celebrex 200 mg p.o. twice daily to be taken with food, recheck hand and knee x-rays today for evaluation of progression  Advised patient to lose weight and to cut out sugar in diet including carbohydrates, patient is not very receptive  - DX-JOINT SURVEY-HANDS SINGLE VIEW; Future  - DX-KNEES-AP BILATERAL STANDING; Future  - celecoxib (CELEBREX) 200 MG Cap; 1 tab po bid prn joint pain  Dispense: 180 Cap; Refill: 0  - COMP METABOLIC PANEL; Future  - CBC WITH DIFFERENTIAL; Future  - methylPREDNISolone acetate (DEPO-MEDROL) injection 40 mg; 1 mL by Intra-articular route Once.  - methylPREDNISolone acetate (DEPO-MEDROL) injection 40 mg; 1 mL by Intra-articular route Once.    2. NSAID long-term use  Status post nabumetone, lost efficacy do trial of Celebrex 200 mg p.o. twice daily with food  Patient will need labs every 6 months, next labs due about December 2018, labs ordered for patient  - DX-JOINT SURVEY-HANDS SINGLE VIEW; Future  - DX-KNEES-AP BILATERAL STANDING; Future  - celecoxib (CELEBREX) 200 MG Cap; 1 tab po bid prn joint pain  Dispense: 180 Cap; Refill: 0  - COMP METABOLIC PANEL; Future  - CBC WITH DIFFERENTIAL; Future    3. Lumbar disc disease  X-rays indicating DJD and uncinate hypertrophy, NSAIDs as needed  - DX-JOINT SURVEY-HANDS SINGLE VIEW; Future  - DX-KNEES-AP BILATERAL STANDING; Future  - celecoxib (CELEBREX) 200 MG Cap; 1 tab po bid prn joint pain  Dispense: 180 Cap; Refill:  0  - COMP METABOLIC PANEL; Future  - CBC WITH DIFFERENTIAL; Future    4. Tobacco abuse  Tobacco abuse is known to exacerbate rheumatoid arthritis, 20 minute discussion with patient regarding the need to stop tobacco abuse, patient states understanding  Patient states he quit about 6 months ago  - COMP METABOLIC PANEL; Future  - CBC WITH DIFFERENTIAL; Future    5. Alcohol use  Patient states he uses alcohol occasionally    Procedure: Bilateral knee corticosteroid injection    The procedure was explained to the patient in detail including potential damage to area being injected including risk of avascular necrosis, all questions were answered, verbal consent to do procedure was obtained. Risks and benefits were reviewed with patient and patient states understanding including risks of steroid injections including bleeding, infections, subcutaneous lipolysis and/or hypopigmentation at site of injection, and risk of avascular necrosis. Verbal consent was again obtained. The patient was positioned appropriately. Anatomical landmarks were identified.    Medial aspect bilateral knees was prepped with betadine x 3, local anesthetic with ethyl chloride and 2% Xylocaine lot #[id number], Exp July 2019 and using sterile technique,  injected 40 mg of Depomedrol Lot #[id number], Exp June 2019 bilateral knees medial approach    EBL 0  The patient tolerated the procedure well, was observed in the office and there were no complications     Patient was asked to rest bilateral knees for 3 days, patient states understanding and states will comply.  Letter was written for patient for his work today.        Followup: Return in about 4 weeks (around 12/11/2018). or sooner prn, patient wanted to get 1 of his PIP joints injected in his right hand.    Ray Tesfaye was seen 30 minutes face-to-face of which more than 50% of the time was spent counseling the patient (excluding time for procedures)  regarding  rheumatological condition and care.  Therapy was discussed in detail.    Please note that this dictation was created using voice recognition software. I have made every reasonable attempt to correct obvious errors, but I expect that there are errors of grammar and possibly content that I did not discover before finalizing the note.

## 2018-12-13 ENCOUNTER — APPOINTMENT (OUTPATIENT)
Dept: RHEUMATOLOGY | Facility: MEDICAL CENTER | Age: 63
End: 2018-12-13
Payer: COMMERCIAL

## 2019-02-25 ENCOUNTER — TELEPHONE (OUTPATIENT)
Dept: RHEUMATOLOGY | Facility: MEDICAL CENTER | Age: 64
End: 2019-02-25

## 2019-03-13 ENCOUNTER — TELEPHONE (OUTPATIENT)
Dept: RHEUMATOLOGY | Facility: MEDICAL CENTER | Age: 64
End: 2019-03-13

## 2019-03-13 DIAGNOSIS — M25.569 CHRONIC KNEE PAIN, UNSPECIFIED LATERALITY: ICD-10-CM

## 2019-03-13 DIAGNOSIS — G89.29 CHRONIC KNEE PAIN, UNSPECIFIED LATERALITY: ICD-10-CM

## 2019-03-13 NOTE — TELEPHONE ENCOUNTER
Patient called and spoke with Ophelia regarding a referral for orthopedics that was placed and sent to [facility]. However he is having issues with the facility and would like his referral  Changed and sent to      [facility]- Dr Chavo Lazo  P [phone] F [phone]    Please advice

## 2019-03-14 ENCOUNTER — HOSPITAL ENCOUNTER (OUTPATIENT)
Dept: LAB | Facility: MEDICAL CENTER | Age: 64
End: 2019-03-14
Attending: FAMILY MEDICINE
Payer: COMMERCIAL

## 2019-03-14 LAB
ALBUMIN SERPL BCP-MCNC: 4.4 G/DL (ref 3.2–4.9)
ALBUMIN/GLOB SERPL: 1.8 G/DL
ALP SERPL-CCNC: 89 U/L (ref 30–99)
ALT SERPL-CCNC: 20 U/L (ref 2–50)
ANION GAP SERPL CALC-SCNC: 5 MMOL/L (ref 0–11.9)
APPEARANCE UR: CLEAR
AST SERPL-CCNC: 20 U/L (ref 12–45)
BASOPHILS # BLD AUTO: 0.5 % (ref 0–1.8)
BASOPHILS # BLD: 0.05 K/UL (ref 0–0.12)
BILIRUB SERPL-MCNC: 0.6 MG/DL (ref 0.1–1.5)
BILIRUB UR QL STRIP.AUTO: NEGATIVE
BUN SERPL-MCNC: 12 MG/DL (ref 8–22)
CALCIUM SERPL-MCNC: 9.2 MG/DL (ref 8.5–10.5)
CHLORIDE SERPL-SCNC: 102 MMOL/L (ref 96–112)
CHOLEST SERPL-MCNC: 268 MG/DL (ref 100–199)
CO2 SERPL-SCNC: 27 MMOL/L (ref 20–33)
COLOR UR: YELLOW
CREAT SERPL-MCNC: 0.84 MG/DL (ref 0.5–1.4)
CRP SERPL HS-MCNC: 0.8 MG/L (ref 0–7.5)
EOSINOPHIL # BLD AUTO: 0.13 K/UL (ref 0–0.51)
EOSINOPHIL NFR BLD: 1.4 % (ref 0–6.9)
ERYTHROCYTE [DISTWIDTH] IN BLOOD BY AUTOMATED COUNT: 44.2 FL (ref 35.9–50)
GLOBULIN SER CALC-MCNC: 2.5 G/DL (ref 1.9–3.5)
GLUCOSE SERPL-MCNC: 97 MG/DL (ref 65–99)
GLUCOSE UR STRIP.AUTO-MCNC: NEGATIVE MG/DL
HCT VFR BLD AUTO: 49.2 % (ref 42–52)
HDLC SERPL-MCNC: 42 MG/DL
HGB BLD-MCNC: 16.8 G/DL (ref 14–18)
IMM GRANULOCYTES # BLD AUTO: 0.02 K/UL (ref 0–0.11)
IMM GRANULOCYTES NFR BLD AUTO: 0.2 % (ref 0–0.9)
KETONES UR STRIP.AUTO-MCNC: NEGATIVE MG/DL
LDLC SERPL CALC-MCNC: 181 MG/DL
LEUKOCYTE ESTERASE UR QL STRIP.AUTO: NEGATIVE
LYMPHOCYTES # BLD AUTO: 3.68 K/UL (ref 1–4.8)
LYMPHOCYTES NFR BLD: 38.3 % (ref 22–41)
MCH RBC QN AUTO: 32.1 PG (ref 27–33)
MCHC RBC AUTO-ENTMCNC: 34.1 G/DL (ref 33.7–35.3)
MCV RBC AUTO: 94.1 FL (ref 81.4–97.8)
MICRO URNS: NORMAL
MONOCYTES # BLD AUTO: 0.51 K/UL (ref 0–0.85)
MONOCYTES NFR BLD AUTO: 5.3 % (ref 0–13.4)
NEUTROPHILS # BLD AUTO: 5.23 K/UL (ref 1.82–7.42)
NEUTROPHILS NFR BLD: 54.3 % (ref 44–72)
NITRITE UR QL STRIP.AUTO: NEGATIVE
NRBC # BLD AUTO: 0 K/UL
NRBC BLD-RTO: 0 /100 WBC
PH UR STRIP.AUTO: 7 [PH]
PLATELET # BLD AUTO: 244 K/UL (ref 164–446)
PMV BLD AUTO: 9.3 FL (ref 9–12.9)
POTASSIUM SERPL-SCNC: 4.3 MMOL/L (ref 3.6–5.5)
PROT SERPL-MCNC: 6.9 G/DL (ref 6–8.2)
PROT UR QL STRIP: NEGATIVE MG/DL
PSA SERPL-MCNC: 1.05 NG/ML (ref 0–4)
RBC # BLD AUTO: 5.23 M/UL (ref 4.7–6.1)
RBC UR QL AUTO: NEGATIVE
SODIUM SERPL-SCNC: 134 MMOL/L (ref 135–145)
SP GR UR STRIP.AUTO: 1.02
TRIGL SERPL-MCNC: 226 MG/DL (ref 0–149)
UROBILINOGEN UR STRIP.AUTO-MCNC: 1 MG/DL
WBC # BLD AUTO: 9.6 K/UL (ref 4.8–10.8)

## 2019-03-14 PROCEDURE — 85025 COMPLETE CBC W/AUTO DIFF WBC: CPT

## 2019-03-14 PROCEDURE — 83013 H PYLORI (C-13) BREATH: CPT

## 2019-03-14 PROCEDURE — 81003 URINALYSIS AUTO W/O SCOPE: CPT

## 2019-03-14 PROCEDURE — 36415 COLL VENOUS BLD VENIPUNCTURE: CPT

## 2019-03-14 PROCEDURE — 84153 ASSAY OF PSA TOTAL: CPT

## 2019-03-14 PROCEDURE — 80053 COMPREHEN METABOLIC PANEL: CPT

## 2019-03-14 PROCEDURE — 86141 C-REACTIVE PROTEIN HS: CPT

## 2019-03-14 PROCEDURE — 80061 LIPID PANEL: CPT

## 2019-03-16 LAB — UREA BREATH TEST QL: NEGATIVE

## 2019-03-18 ENCOUNTER — APPOINTMENT (OUTPATIENT)
Dept: RHEUMATOLOGY | Facility: MEDICAL CENTER | Age: 64
End: 2019-03-18
Payer: COMMERCIAL

## 2019-08-01 ENCOUNTER — HOSPITAL ENCOUNTER (OUTPATIENT)
Facility: MEDICAL CENTER | Age: 64
End: 2019-08-01
Attending: PHYSICIAN ASSISTANT
Payer: COMMERCIAL

## 2019-08-01 PROCEDURE — 87077 CULTURE AEROBIC IDENTIFY: CPT

## 2019-08-01 PROCEDURE — 87186 SC STD MICRODIL/AGAR DIL: CPT

## 2019-08-01 PROCEDURE — 87086 URINE CULTURE/COLONY COUNT: CPT

## 2019-08-04 LAB
BACTERIA UR CULT: ABNORMAL
BACTERIA UR CULT: ABNORMAL
SIGNIFICANT IND 70042: ABNORMAL
SITE SITE: ABNORMAL
SOURCE SOURCE: ABNORMAL

## 2019-12-24 ENCOUNTER — APPOINTMENT (RX ONLY)
Dept: URBAN - METROPOLITAN AREA CLINIC 20 | Facility: CLINIC | Age: 64
Setting detail: DERMATOLOGY
End: 2019-12-24

## 2019-12-24 DIAGNOSIS — D22 MELANOCYTIC NEVI: ICD-10-CM

## 2019-12-24 DIAGNOSIS — L72.0 EPIDERMAL CYST: ICD-10-CM

## 2019-12-24 DIAGNOSIS — L82.1 OTHER SEBORRHEIC KERATOSIS: ICD-10-CM

## 2019-12-24 DIAGNOSIS — L82.0 INFLAMED SEBORRHEIC KERATOSIS: ICD-10-CM

## 2019-12-24 DIAGNOSIS — L81.4 OTHER MELANIN HYPERPIGMENTATION: ICD-10-CM

## 2019-12-24 DIAGNOSIS — L85.3 XEROSIS CUTIS: ICD-10-CM

## 2019-12-24 DIAGNOSIS — D18.0 HEMANGIOMA: ICD-10-CM

## 2019-12-24 DIAGNOSIS — Z71.89 OTHER SPECIFIED COUNSELING: ICD-10-CM

## 2019-12-24 PROBLEM — D22.5 MELANOCYTIC NEVI OF TRUNK: Status: ACTIVE | Noted: 2019-12-24

## 2019-12-24 PROBLEM — D22.61 MELANOCYTIC NEVI OF RIGHT UPPER LIMB, INCLUDING SHOULDER: Status: ACTIVE | Noted: 2019-12-24

## 2019-12-24 PROBLEM — D18.01 HEMANGIOMA OF SKIN AND SUBCUTANEOUS TISSUE: Status: ACTIVE | Noted: 2019-12-24

## 2019-12-24 PROBLEM — D22.62 MELANOCYTIC NEVI OF LEFT UPPER LIMB, INCLUDING SHOULDER: Status: ACTIVE | Noted: 2019-12-24

## 2019-12-24 PROCEDURE — 99203 OFFICE O/P NEW LOW 30 MIN: CPT | Mod: 25

## 2019-12-24 PROCEDURE — ? RECOMMENDATIONS

## 2019-12-24 PROCEDURE — ? COUNSELING

## 2019-12-24 PROCEDURE — ? LIQUID NITROGEN

## 2019-12-24 PROCEDURE — 17110 DESTRUCTION B9 LES UP TO 14: CPT

## 2019-12-24 PROCEDURE — ? SUNSCREEN RECOMMENDATIONS

## 2019-12-24 ASSESSMENT — LOCATION DETAILED DESCRIPTION DERM
LOCATION DETAILED: RIGHT INFERIOR UPPER BACK
LOCATION DETAILED: RIGHT SUPERIOR LATERAL MALAR CHEEK
LOCATION DETAILED: RIGHT INFERIOR FOREHEAD
LOCATION DETAILED: RIGHT INFERIOR MEDIAL FOREHEAD
LOCATION DETAILED: LEFT FOREHEAD
LOCATION DETAILED: LEFT CLAVICULAR NECK
LOCATION DETAILED: LEFT SUPERIOR MEDIAL UPPER BACK
LOCATION DETAILED: LEFT RADIAL DORSAL HAND
LOCATION DETAILED: RIGHT CLAVICULAR NECK
LOCATION DETAILED: RIGHT INFERIOR LATERAL FOREHEAD
LOCATION DETAILED: RIGHT RADIAL DORSAL HAND
LOCATION DETAILED: RIGHT VENTRAL DISTAL FOREARM
LOCATION DETAILED: RIGHT CENTRAL TEMPLE
LOCATION DETAILED: SUPERIOR THORACIC SPINE
LOCATION DETAILED: LEFT PROXIMAL DORSAL FOREARM
LOCATION DETAILED: RIGHT PROXIMAL DORSAL FOREARM
LOCATION DETAILED: INFERIOR THORACIC SPINE
LOCATION DETAILED: RIGHT MEDIAL UPPER BACK
LOCATION DETAILED: RIGHT MEDIAL SUPERIOR CHEST
LOCATION DETAILED: LEFT SUPERIOR CENTRAL MALAR CHEEK
LOCATION DETAILED: LEFT CENTRAL ZYGOMA
LOCATION DETAILED: RIGHT SUPERIOR TEMPLE
LOCATION DETAILED: LEFT VENTRAL PROXIMAL FOREARM
LOCATION DETAILED: LEFT MID TEMPLE
LOCATION DETAILED: LEFT SUPERIOR CENTRAL BUCCAL CHEEK
LOCATION DETAILED: LEFT SUPERIOR LATERAL NECK

## 2019-12-24 ASSESSMENT — LOCATION SIMPLE DESCRIPTION DERM
LOCATION SIMPLE: RIGHT ANTERIOR NECK
LOCATION SIMPLE: RIGHT HAND
LOCATION SIMPLE: UPPER BACK
LOCATION SIMPLE: CHEST
LOCATION SIMPLE: LEFT CHEEK
LOCATION SIMPLE: RIGHT UPPER BACK
LOCATION SIMPLE: LEFT UPPER BACK
LOCATION SIMPLE: LEFT FOREHEAD
LOCATION SIMPLE: RIGHT TEMPLE
LOCATION SIMPLE: LEFT ANTERIOR NECK
LOCATION SIMPLE: LEFT TEMPLE
LOCATION SIMPLE: LEFT HAND
LOCATION SIMPLE: RIGHT CHEEK
LOCATION SIMPLE: RIGHT FOREHEAD
LOCATION SIMPLE: NECK
LOCATION SIMPLE: LEFT FOREARM
LOCATION SIMPLE: RIGHT FOREARM
LOCATION SIMPLE: LEFT ZYGOMA

## 2019-12-24 ASSESSMENT — LOCATION ZONE DERM
LOCATION ZONE: HAND
LOCATION ZONE: ARM
LOCATION ZONE: TRUNK
LOCATION ZONE: NECK
LOCATION ZONE: FACE

## 2019-12-24 NOTE — PROCEDURE: RECOMMENDATIONS
Detail Level: Zone
Recommendation Preamble: The following recommendations were made during the visit: CeraVe

## 2019-12-24 NOTE — PROCEDURE: LIQUID NITROGEN
Detail Level: Detailed
Consent: The patient's consent was obtained including but not limited to risks of crusting, scabbing, blistering, scarring, darker or lighter pigmentary change, recurrence, incomplete removal and infection.
Render Post-Care Instructions In Note?: no
Duration Of Freeze Thaw-Cycle (Seconds): 5-10
Post-Care Instructions: I reviewed with the patient in detail post-care instructions. Patient is to wear sunprotection, and avoid picking at any of the treated lesions. Pt may apply Vaseline to crusted or scabbing areas.
ambulatory
Medical Necessity Clause: This procedure was medically necessary because the lesions that were treated were:
Number Of Freeze-Thaw Cycles: 2 freeze-thaw cycles
Medical Necessity Information: It is in your best interest to select a reason for this procedure from the list below. All of these items fulfill various CMS LCD requirements except the new and changing color options.

## 2020-11-21 ENCOUNTER — APPOINTMENT (OUTPATIENT)
Dept: RADIOLOGY | Facility: IMAGING CENTER | Age: 65
End: 2020-11-21
Attending: NURSE PRACTITIONER
Payer: COMMERCIAL

## 2020-11-21 ENCOUNTER — OCCUPATIONAL MEDICINE (OUTPATIENT)
Dept: URGENT CARE | Facility: PHYSICIAN GROUP | Age: 65
End: 2020-11-21
Payer: COMMERCIAL

## 2020-11-21 VITALS
OXYGEN SATURATION: 98 % | SYSTOLIC BLOOD PRESSURE: 150 MMHG | DIASTOLIC BLOOD PRESSURE: 90 MMHG | TEMPERATURE: 98.5 F | HEIGHT: 68 IN | BODY MASS INDEX: 37.89 KG/M2 | WEIGHT: 250 LBS | HEART RATE: 87 BPM

## 2020-11-21 DIAGNOSIS — R20.0 NUMBNESS AND TINGLING SENSATION OF SKIN: ICD-10-CM

## 2020-11-21 DIAGNOSIS — R20.2 NUMBNESS AND TINGLING SENSATION OF SKIN: ICD-10-CM

## 2020-11-21 DIAGNOSIS — Z02.1 PRE-EMPLOYMENT DRUG SCREENING: ICD-10-CM

## 2020-11-21 DIAGNOSIS — S41.112A LACERATION OF LEFT UPPER EXTREMITY, INITIAL ENCOUNTER: ICD-10-CM

## 2020-11-21 LAB
AMP AMPHETAMINE: NORMAL
BAR BARBITURATES: NORMAL
BZO BENZODIAZEPINES: NORMAL
COC COCAINE: NORMAL
INT CON NEG: NORMAL
INT CON POS: NORMAL
MDMA ECSTASY: NORMAL
MET METHAMPHETAMINES: NORMAL
MTD METHADONE: NORMAL
OPI OPIATES: NORMAL
OXY OXYCODONE: NORMAL
PCP PHENCYCLIDINE: NORMAL
POC URINE DRUG SCREEN OCDRS: NEGATIVE
THC: NORMAL

## 2020-11-21 PROCEDURE — 99203 OFFICE O/P NEW LOW 30 MIN: CPT | Mod: 25 | Performed by: NURSE PRACTITIONER

## 2020-11-21 PROCEDURE — 80305 DRUG TEST PRSMV DIR OPT OBS: CPT | Performed by: NURSE PRACTITIONER

## 2020-11-21 PROCEDURE — 90471 IMMUNIZATION ADMIN: CPT | Performed by: NURSE PRACTITIONER

## 2020-11-21 PROCEDURE — 90715 TDAP VACCINE 7 YRS/> IM: CPT | Performed by: NURSE PRACTITIONER

## 2020-11-21 PROCEDURE — 12001 RPR S/N/AX/GEN/TRNK 2.5CM/<: CPT | Performed by: NURSE PRACTITIONER

## 2020-11-21 PROCEDURE — 73090 X-RAY EXAM OF FOREARM: CPT | Mod: TC,LT | Performed by: NURSE PRACTITIONER

## 2020-11-21 RX ORDER — TRAMADOL HYDROCHLORIDE 50 MG/1
50 TABLET ORAL PRN
COMMUNITY
Start: 2020-08-28 | End: 2023-08-24

## 2020-11-21 RX ORDER — SILDENAFIL 100 MG/1
100 TABLET, FILM COATED ORAL PRN
COMMUNITY
Start: 2020-11-10 | End: 2023-08-24

## 2020-11-21 RX ORDER — SULFAMETHOXAZOLE AND TRIMETHOPRIM 800; 160 MG/1; MG/1
1 TABLET ORAL 2 TIMES DAILY
Qty: 14 TAB | Refills: 0 | Status: SHIPPED | OUTPATIENT
Start: 2020-11-21 | End: 2020-11-28

## 2020-11-21 ASSESSMENT — FIBROSIS 4 INDEX: FIB4 SCORE: 1.14

## 2020-11-21 NOTE — LETTER
[facility] [address] - [place] [address]  Phone:  [phone] - Fax:  [phone]   [facility] Progress Report and Disability Certification  Date of Service: 11/21/2020   No Show:  No  Date / Time of Next Visit: 11/23/2020@6:00PM   Claim Information   Patient Name: Ray Tesfaye  Claim Number:     Employer: [employer]  Date of Injury: 11/21/2020     Insurer / TPA: Vivienne Aguilar  ID / SSN:     Occupation:   Diagnosis: Diagnoses of Laceration of left upper extremity, initial encounter and Numbness and tingling sensation of skin were pertinent to this visit.    Medical Information   Related to Industrial Injury? Yes    Subjective Complaints:  DOI: 11/21/20- pt reports he was at work using a  to cut a thick piece of plastic. He states the  blade slipped and cut his left forearm. He notes there was bleeding initially, but with applied pressure the bleeding stopped. He denies a second job.    Objective Findings: Left forearm- 2 cm full thickness laceration volar aspect, mid shaft of forearm. Pt reports sensation of numbness near laceration site and along radial aspect of forearm. He states he has about 95% of feeling, but it is not 100%. FROM left wrist and left fingers. No deformities of left forearm. Distal NV intact. Brisk cap refill.    Procedure: Laceration Repair  -Risks including bleeding, nerve damage, infection, and poor cosmetic outcome discussed. Benefits and alternatives discussed.   -Clean technique with sterile instruments and suture used  -Local anesthesia with 2% lidocaine  -Closed with #4  5-0 Nylon interrupted sutures with good wound approximation  -Polysporin and dressing placed  -Patient tolerated well         Pre-Existing Condition(s):     Assessment:   Initial Visit    Status: Additional Care Required  Permanent Disability:No    Plan: Medication    Diagnostics: X-ray  Comments:negative       Comments:  Laceration repair  Tetanus booster  Antibiotics  Tylenol and ibuprofen as needed for pain  Work restrictions  FV in 2 days for wound check  Sutures out in 7 days    Disability Information   Status: Released to Restricted Duty    From:  11/21/2020  Through: 11/23/2020 Restrictions are: Temporary   Physical Restrictions   Sitting:    Standing:    Stooping:    Bending:      Squatting:    Walking:    Climbing:    Pushing:      Pulling:    Other:    Reaching Above Shoulder (L):   Reaching Above Shoulder (R):       Reaching Below Shoulder (L):    Reaching Below Shoulder (R):      Not to exceed Weight Limits   Carrying(hrs):   Weight Limit(lb): < or = to 25 pounds Lifting(hrs):   Weight  Limit(lb): < or = to 25 pounds   Comments: Nothing to lean or rub against left forearm    Repetitive Actions   Hands: i.e. Fine Manipulations from Grasping:     Feet: i.e. Operating Foot Controls:     Driving / Operate Machinery:     Provider Name:   ELY Gorman Physician Signature:  Physician Name:     Clinic Name / Location: [address]  [place] [address] Clinic Phone Number: Dept: [phone]   Appointment Time: 2:20 Pm Visit Start Time: 2:56 PM   Check-In Time:  2:26 Pm Visit Discharge Time:  4:14PM   Original-Treating Physician or Chiropractor    Page 2-Insurer/TPA    Page 3-Employer    Page 4-Employee

## 2020-11-22 NOTE — PROGRESS NOTES
"Subjective:      Ray Tesfaye is a 64 y.o. male who presents with Extremity Laceration (cut left arm, above wrist this morning in the process of cutting plastic, numbness beginning at the bottom of wrist. )      DOI: 11/21/20- pt reports he was at work using a  to cut a thick piece of plastic. He states the  blade slipped and cut his left forearm. He notes there was bleeding initially, but with applied pressure the bleeding stopped. He denies a second job.      HPI    Review of Systems   Skin:        Lac left forearm   All other systems reviewed and are negative.    PMH: No pertinent past medical history to this problem  MEDS: Medications were reviewed in Epic  ALLERGIES: Allergies were reviewed in Epic  SOCHX: Works as a  at [employer]   FH: No pertinent family history to this problem         Objective:     /90 (BP Location: Left arm, Patient Position: Sitting, BP Cuff Size: Adult)   Pulse 87   Temp 36.9 °C (98.5 °F) (Temporal)   Ht 1.727 m (5' 8\")   Wt 113.4 kg (250 lb)   SpO2 98%   BMI 38.01 kg/m²      Physical Exam  Vitals signs and nursing note reviewed.   Constitutional:       Appearance: Normal appearance.   HENT:      Head: Normocephalic and atraumatic.      Nose: Nose normal.      Mouth/Throat:      Mouth: Mucous membranes are moist.   Eyes:      Extraocular Movements: Extraocular movements intact.      Pupils: Pupils are equal, round, and reactive to light.   Neck:      Musculoskeletal: Normal range of motion.   Cardiovascular:      Rate and Rhythm: Normal rate and regular rhythm.   Pulmonary:      Effort: Pulmonary effort is normal.   Musculoskeletal: Normal range of motion.        Arms:    Skin:     General: Skin is warm and dry.      Capillary Refill: Capillary refill takes less than 2 seconds.   Neurological:      General: No focal deficit present.      Mental Status: He is alert and oriented to person, place, and time. Mental status is at baseline.   "   Psychiatric:         Mood and Affect: Mood normal.         Speech: Speech normal.         Thought Content: Thought content normal.         Judgment: Judgment normal.         Left forearm- 2 cm full thickness laceration volar aspect, mid shaft of forearm. Pt reports sensation of numbness near laceration site and along radial aspect of forearm. He states he has about 95% of feeling, but it is not 100%. FROM left wrist and left fingers. No deformities of left forearm. Distal NV intact. Brisk cap refill.    Procedure: Laceration Repair  -Risks including bleeding, nerve damage, infection, and poor cosmetic outcome discussed. Benefits and alternatives discussed.   -Clean technique with sterile instruments and suture used  -Local anesthesia with 2% lidocaine  -Closed with #4  5-0 Nylon interrupted sutures with good wound approximation  -Polysporin and dressing placed  -Patient tolerated well        11/21/2020 3:18 PM     HISTORY/REASON FOR EXAM:  Atraumatic Pain/Swelling/Deformity.  Laceration today     TECHNIQUE/EXAM DESCRIPTION AND NUMBER OF VIEWS:  2 views of the LEFT forearm.     COMPARISON: None     FINDINGS:  No foreign body is detected     No acute fracture or subluxation is seen. No bony destruction     IMPRESSION:     No radiographic evidence of acute bony abnormality or foreign body     Assessment/Plan:        1. Laceration of left upper extremity, initial encounter  - Tdap =>6yo IM  - sulfamethoxazole-trimethoprim (BACTRIM DS) 800-160 MG tablet; Take 1 Tab by mouth 2 times a day for 7 days.  Dispense: 14 Tab; Refill: 0    2. Numbness and tingling sensation of skin  - DX-FOREARM LEFT; Future    Laceration repair  Tetanus booster  Antibiotics  Tylenol and ibuprofen as needed for pain  Work restrictions  FV in 2 days for wound check  Sutures out in 7 days

## 2020-11-23 ENCOUNTER — OCCUPATIONAL MEDICINE (OUTPATIENT)
Dept: URGENT CARE | Facility: PHYSICIAN GROUP | Age: 65
End: 2020-11-23
Payer: COMMERCIAL

## 2020-11-23 VITALS
HEIGHT: 68 IN | DIASTOLIC BLOOD PRESSURE: 82 MMHG | BODY MASS INDEX: 37.89 KG/M2 | WEIGHT: 250 LBS | OXYGEN SATURATION: 99 % | HEART RATE: 79 BPM | TEMPERATURE: 97.1 F | SYSTOLIC BLOOD PRESSURE: 132 MMHG

## 2020-11-23 DIAGNOSIS — R20.0 NUMBNESS AND TINGLING SENSATION OF SKIN: ICD-10-CM

## 2020-11-23 DIAGNOSIS — R20.2 NUMBNESS AND TINGLING SENSATION OF SKIN: ICD-10-CM

## 2020-11-23 DIAGNOSIS — S41.112D LACERATION OF LEFT UPPER EXTREMITY, SUBSEQUENT ENCOUNTER: ICD-10-CM

## 2020-11-23 PROCEDURE — 99214 OFFICE O/P EST MOD 30 MIN: CPT | Performed by: NURSE PRACTITIONER

## 2020-11-23 SDOH — HEALTH STABILITY: MENTAL HEALTH: HOW OFTEN DO YOU HAVE A DRINK CONTAINING ALCOHOL?: NEVER

## 2020-11-23 ASSESSMENT — FIBROSIS 4 INDEX: FIB4 SCORE: 1.14

## 2020-11-23 NOTE — LETTER
[facility]  [address] [place] [address]. [address] - [place] [address]  Phone:  [phone] - Fax:  [phone]   [facility] Progress Report and Disability Certification  Date of Service: 11/23/2020   No Show:  No  Date / Time of Next Visit: 11/28/2020@11:00AM   Claim Information   Patient Name: Ray Tesfaye  Claim Number:     Employer: [employer]  Date of Injury: 11/21/2020     Insurer / TPA: Vivienne Aguilar  ID / SSN:     Occupation:   Diagnosis: Diagnoses of Laceration of left upper extremity, subsequent encounter and Numbness and tingling sensation of skin were pertinent to this visit.    Medical Information   Related to Industrial Injury? Yes    Subjective Complaints:  DOI: 11/21/20- pt reports he was at work using a  to cut a thick piece of plastic. He states the  blade slipped and cut his left forearm. He notes there was bleeding initially, but with applied pressure the bleeding stopped. He denies a second job.      FV: 11/23/2020  Patient feels improved. No redness or drainage from laceration. Slight numbness around the wound edges laterally.  No fevers or chills.    Objective Findings: Left forearm- Well approximated laceration volar aspect, mid shaft of forearm with sutures in place. No surrounding erythema, warmth or drainage. Pt reports sensation of numbness near laceration site and along radial aspect of forearm.  FROM left wrist and left fingers. No deformities of left forearm. Distal NV intact. Brisk cap refill.   Pre-Existing Condition(s): None   Assessment:   Condition Improved    Status: Additional Care Required  Permanent Disability:No    Plan:      Diagnostics:      Comments:       Disability Information   Status: Released to Restricted Duty    From:  11/23/2020  Through: 11/28/2020 Restrictions are: Temporary   Physical Restrictions   Sitting:    Standing:    Stooping:    Bending:      Squatting:    Walking:   Climbing:    Pushing:      Pulling:    Other:    Reaching Above Shoulder (L):   Reaching Above Shoulder (R):       Reaching Below Shoulder (L):    Reaching Below Shoulder (R):      Not to exceed Weight Limits   Carrying(hrs):   Weight Limit(lb): < or = to 25 pounds  Comments:Left arm only Lifting(hrs):   Weight  Limit(lb): < or = to 25 pounds  Comments:left arm only   Comments:  Nothing to lean or rub against left forearm  F/U in 5 days for suture removal.       Repetitive Actions   Hands: i.e. Fine Manipulations from Grasping:     Feet: i.e. Operating Foot Controls:     Driving / Operate Machinery:     Provider Name:   AMANDA ShortRJEYSON Physician Signature:  Physician Name:     Clinic Name / Location: [address]  [place] [address] Clinic Phone Number: Dept: [phone]   Appointment Time: 6:00 Pm Visit Start Time: 6:11 PM   Check-In Time:  5:20 Pm Visit Discharge Time:  6:27PM   Original-Treating Physician or Chiropractor    Page 2-Insurer/TPA    Page 3-Employer    Page 4-Employee

## 2020-11-24 ASSESSMENT — ENCOUNTER SYMPTOMS
SHORTNESS OF BREATH: 0
CHILLS: 0
NAUSEA: 0
FEVER: 0
VOMITING: 0
MYALGIAS: 0
TINGLING: 0
SENSORY CHANGE: 1

## 2020-11-24 NOTE — PROGRESS NOTES
"Subjective:      Ray Tesfaye is a 64 y.o. male who presents with Work-Related Injury (left forearm injury, numb around site. )      DOI: 11/21/20- pt reports he was at work using a  to cut a thick piece of plastic. He states the  blade slipped and cut his left forearm. He notes there was bleeding initially, but with applied pressure the bleeding stopped. He denies a second job.      FV: 11/23/2020  Patient feels improved. No redness or drainage from laceration. Slight numbness around the wound edges laterally.  No fevers or chills.          Review of Systems   Constitutional: Negative for chills and fever.   Respiratory: Negative for shortness of breath.    Cardiovascular: Negative for chest pain.   Gastrointestinal: Negative for nausea and vomiting.   Musculoskeletal: Negative for myalgias.   Skin: Negative for rash.        Left forearm laceration (previously repaired with sutures)   Neurological: Positive for sensory change. Negative for tingling.   All other systems reviewed and are negative.         Objective:     /82 (BP Location: Right arm, Patient Position: Sitting, BP Cuff Size: Adult)   Pulse 79   Temp 36.2 °C (97.1 °F) (Temporal)   Ht 1.727 m (5' 8\")   Wt 113.4 kg (250 lb)   SpO2 99%   BMI 38.01 kg/m²      Physical Exam  Vitals signs reviewed.   Constitutional:       General: He is not in acute distress.     Appearance: Normal appearance. He is not ill-appearing.   HENT:      Head: Normocephalic.      Right Ear: External ear normal.      Left Ear: External ear normal.      Nose: Nose normal.      Mouth/Throat:      Mouth: Mucous membranes are moist.   Eyes:      Extraocular Movements: Extraocular movements intact.      Conjunctiva/sclera: Conjunctivae normal.   Neck:      Musculoskeletal: Normal range of motion.   Cardiovascular:      Rate and Rhythm: Normal rate and regular rhythm.      Pulses: Normal pulses.   Pulmonary:      Effort: Pulmonary effort is normal. "   Abdominal:      Palpations: Abdomen is soft.   Musculoskeletal: Normal range of motion.   Skin:     General: Skin is warm and dry.      Capillary Refill: Capillary refill takes less than 2 seconds.   Neurological:      Mental Status: He is alert and oriented to person, place, and time.   Psychiatric:         Mood and Affect: Mood normal.         Behavior: Behavior normal.         Left forearm- Well approximated laceration volar aspect, mid shaft of forearm with sutures in place. No surrounding erythema, warmth or drainage. Pt reports sensation of numbness near laceration site and along radial aspect of forearm.  FROM left wrist and left fingers. No deformities of left forearm. Distal NV intact. Brisk cap refill.       Assessment/Plan:        1. Laceration of left upper extremity, subsequent encounter     2. Numbness and tingling sensation of skin       Improving.  F/U in 5 days for suture removal.  Work restrictions per D-39.  May take over-the-counter Ibuprofen 400-600 mg every 8 hours as needed for pain

## 2021-02-03 NOTE — PROGRESS NOTES
Chief Complaint- joint pain    Subjective:   Ray Tesfaye is a 62 y.o. male here today for follow up of rheumatological issues    This is a follow-up visit for this patient for osteoarthritis of bilateral knees, patient gets about every 6 months either cortisone or Synvisc injections in knees, last Synvisc injection November 2017. Patient is receiving cortisone injections bilateral knees today. 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 Patient has x-rays of knees indicated bone-on-bone arthritis, patient is considering TKA so as to hold off on that for another year.   Patient also requests a refill on nabumetone of note patient hasn't had any screening labs since 2014.    Comorbidities include GERD, chronic low back pain and obesity.         Uric acid 5.0 12/2014  CCP neg 12/2014  BILLIE neg 12/2014       Current medicines (including changes today)  Current Outpatient Prescriptions   Medication Sig Dispense Refill   • nabumetone (RELAFEN) 500 MG Tab TAKE 2 TABLETS BY MOUTH TWICE DAILY 360 Tab 0   • tamsulosin (FLOMAX) 0.4 MG capsule Take 0.4 mg by mouth ONE-HALF HOUR AFTER BREAKFAST.     • tizanidine (ZANAFLEX) 4 MG Tab TAKE 1 TABLET BY MOUTH ONCE DAILY AT BEDTIME AS NEEDED FOR MUSCLE SPASMS(NO DRIVING ON THIS MEDICATION) 30 Tab 2   • hydrocodone-acetaminophen (NORCO) 5-325 MG Tab per tablet 1 tab po bid prn SEVERE pain, NO ALCOHOL , NO DRIVING and NO MARIJUANA  within 24 hrs of taking this medication,NO BENZODIAZEPINE medications, no selling or giving to any other persons. 60 Tab 0   • mupirocin calcium (BACTROBAN) 2 % Cream Apply a small amount to affected area bid for 10 days per treatment round 1 Tube 0   • ATORVASTATIN CALCIUM PO Take  by mouth.     • omeprazole (PRILOSEC) 20 MG CPDR Take 1 Cap by mouth 1 time daily as needed. 30 Cap 6     No current facility-administered medications for this visit.      He  has a past medical history of Arthritis;  Lindsay is calling and is wondering why about her biopsy results.  It is not in her mychart.    Please call with results at [phone]   ED (erectile dysfunction); GERD (gastroesophageal reflux disease) (12/27/2011); and Osteoarthritis of lumbar spine (12/27/2011).    ROS   Other than what is mentioned in HPI or physical exam, there is no history of headaches, double vision or blurred vision. No temporal tenderness or jaw claudication. No history of cataracts or glaucoma. No trouble swallowing difficulties or sore throats.  No chest complaints including chest pain, cough or sputum production. No shortness of breath. No GI complaints including nausea, vomiting, change in bowel habits, or past peptic ulcer disease. No history of blood in the stools. No urinary complaints including dysuria or frequency. No history of alopecia, photosensitivity, oral ulcerations, Raynaud's phenomena.       Objective:     Blood pressure 118/70, pulse 72, temperature 36.3 °C (97.3 °F), resp. rate 14, weight 115.7 kg (255 lb), SpO2 94 %. Body mass index is 37.66 kg/m².   Physical Exam:    Constitutional: Alert and oriented X3, no distress, patient is talkative with good eye contact.Skin: Warm, dry, good turgor, no rashes in visible areas, no psoriatic lesions, no petechial lesions, no malar rashes.Eye: Equal, round and reactive, conjunctiva clear, lids normal EOM intactENMT: Lips without lesions, good dentition, no oropharyngeal ulcers, moist buccal mucosa, pinna without deformityNeck: Trachea midline, no masses, no thyromegaly.Lymph:  No cervical lymphadenopathy, no axillary lymphadenopathy, no supraclavicular lymphadenopathyRespiratory: Unlabored respiratory effort, lungs clear to auscultation, no wheezes, no ronchi.Cardiovascular: Normal S1, S2, no murmur, no edema.Abdomen: Soft, non-tender, no masses, no hepatosplenomegaly, patient with central obesity.Psych: Alert and oriented x3, normal affect and mood.Neuro: Cranial nerves 2-12 are grossly intact, no loss of sensation LEExt:no joint laxity noted in bilateral arms, no joint laxity noted in bilateral legs, evidence of  varus deformity bilateral knees, there is some mild synovial thickening, but no erythema no redness, no lymphangitis, positive crepitus and evidence of a hypertrophy of bilateral knees, no Achilles tendon inflammation, hands and feet without deformities    Lab Results   Component Value Date/Time    SODIUM 139 12/11/2014 04:05 PM    POTASSIUM 4.2 12/11/2014 04:05 PM    CHLORIDE 105 12/11/2014 04:05 PM    CO2 27 12/11/2014 04:05 PM    GLUCOSE 103 (H) 12/11/2014 04:05 PM    BUN 10 12/11/2014 04:05 PM    CREATININE 0.83 12/11/2014 04:05 PM      Lab Results   Component Value Date/Time    WBC 8.8 12/11/2014 04:05 PM    RBC 4.96 12/11/2014 04:05 PM    HEMOGLOBIN 15.8 12/11/2014 04:05 PM    HEMATOCRIT 46.2 12/11/2014 04:05 PM    MCV 93.1 12/11/2014 04:05 PM    MCH 31.9 12/11/2014 04:05 PM    MCHC 34.2 12/11/2014 04:05 PM    MPV 9.4 12/11/2014 04:05 PM    NEUTSPOLYS 57.5 12/11/2014 04:05 PM    LYMPHOCYTES 34.1 12/11/2014 04:05 PM    MONOCYTES 5.9 12/11/2014 04:05 PM    EOSINOPHILS 1.5 12/11/2014 04:05 PM    BASOPHILS 0.8 12/11/2014 04:05 PM      Lab Results   Component Value Date/Time    CALCIUM 9.8 12/11/2014 04:05 PM    ASTSGOT 18 12/11/2014 04:05 PM    ALTSGPT 24 12/11/2014 04:05 PM    ALKPHOSPHAT 87 12/11/2014 04:05 PM    TBILIRUBIN 0.4 12/11/2014 04:05 PM    ALBUMIN 4.7 12/11/2014 04:05 PM    TOTPROTEIN 7.2 12/11/2014 04:05 PM     Lab Results   Component Value Date/Time    URICACID 5.0 12/11/2014 04:05 PM    CCPANTIBODY 3 12/11/2014 04:05 PM    ANTINUCAB None Detected 12/11/2014 04:05 PM     Lab Results   Component Value Date/Time    SEDRATEWES 7 12/11/2014 04:05 PM     Lab Results   Component Value Date/Time    HBA1C 5.8 05/25/2013 08:54 AM     Lab Results   Component Value Date/Time    CPKTOTAL 82 12/11/2014 04:05 PM     Lab Results   Component Value Date/Time    FLTYPE Synovial 12/11/2014 03:19 PM    CRYSTALSBDF None Seen 12/11/2014 03:19 PM     Results for orders placed in visit on 12/11/14   DX-JOINT SURVEY-HANDS  SINGLE VIEW    Impression Mild osteoarthritis affecting the interphalangeal joints.     Results for orders placed in visit on 12/11/14   DX-KNEES-AP BILATERAL STANDING    Impression Bilateral osteoarthritis primarily affecting the medial compartments.     Results for orders placed in visit on 12/11/14   DX-PELVIS-1 OR 2 VIEWS    Impression Unremarkable examination of the pelvis.     Results for orders placed in visit on 12/11/14   DX-KNEES-AP BILATERAL STANDING    Impression Bilateral osteoarthritis primarily affecting the medial compartments.     Results for orders placed in visit on 12/11/14   DX-PELVIS-1 OR 2 VIEWS    Impression Unremarkable examination of the pelvis.     Results for orders placed in visit on 07/28/14   DX-KNEE COMPLETE 4+    Impression 1. Moderate osteoarthritis.    2. Moderate joint effusion. Further evaluation for internal derangement can be obtained with MRI.     Results for orders placed in visit on 03/06/14   MR-LUMBAR SPINE-W/O    Impression 1.  Mild degenerative disease in the lumbar spine as described above.  2.  There is mild central canal stenosis at L4-5. Minimal degenerative anterolisthesis is noted at this level.     Results for orders placed during the hospital encounter of 09/12/14   MR-KNEE-W/O    Impression 1.   Diffuse maceration of the medial meniscus unless the patient has had prior meniscectomy. There is a possible flap displaced from the meniscus superiorly.    2.  Severe degenerative change of the medial femorotibial articulation. There are milder degenerative changes of the lateral femorotibial and patellofemoral articulations.    3.  Joint effusion with synovitis.     Results for orders placed in visit on 10/19/13   DX-CERVICAL SPINE-2 OR 3 VIEWS    Impression Multilevel arthropathy, mainly at C5-6.  No acute findings.            INTERPRETING LOCATION: [address] [place] [address] [place], [id number]     Assessment and Plan:     1. Primary osteoarthritis involving multiple  joints  Especially knees, today we'll do bilateral cortisone injections bilateral knees  Will have patient do monitoring labs i.e. CMP and CBC if normal then refilled nabumetone 500 mg by mouth twice a day  - methylPREDNISolone acetate (DEPO-MEDROL) injection 40 mg; 1 mL by Intra-articular route Once.  - methylPREDNISolone acetate (DEPO-MEDROL) injection 40 mg; 1 mL by Intra-articular route Once.  - COMP METABOLIC PANEL; Future  - CBC WITH DIFFERENTIAL; Future    2. Chronic pain of both knees  Андрей arthritis with past x-rays indicating bone-on-bone patient not ready for TKAs it today we'll do bilateral knee cortisone injections  - methylPREDNISolone acetate (DEPO-MEDROL) injection 40 mg; 1 mL by Intra-articular route Once.  - methylPREDNISolone acetate (DEPO-MEDROL) injection 40 mg; 1 mL by Intra-articular route Once.  - COMP METABOLIC PANEL; Future  - CBC WITH DIFFERENTIAL; Future    3. NSAID long-term use  Last labs 2014, today we'll check CMP and CBC  - COMP METABOLIC PANEL; Future  - CBC WITH DIFFERENTIAL; Future    4. Lumbar disc disease  Uses NSAIDs when necessary  - COMP METABOLIC PANEL; Future  - CBC WITH DIFFERENTIAL; Future    5. Overweight  Exacerbates the patient's arthritis, recommend to lose weight, patient states understanding  - COMP METABOLIC PANEL; Future  - CBC WITH DIFFERENTIAL; Future    Procedure: Bilateral knee cortisone injections    The procedure was explained to the patient in detail, all questions were answered, verbal consent to do procedure was obtained. Risks and benefits were reviewed with patient and patient states understanding including risks of steroid injections including bleeding, infections, subcutaneous lipolysis and/or hypopigmentation at site of injection, and risk of avascular necrosis. Verbal consent was again obtained. The patient was positioned appropriately. Anatomical landmarks were identified.    Medial aspect of bilateral knees was prepped with betadine x 3, local  anesthetic with ethyl chloride and 2% Xylocaine lot #[id number], Exp July 2019 and using sterile technique,  injected 30 mg of Depomedrol Lot #[id number], Exp March 2019    EBL 0  The patient tolerated the procedure well, was observed in the office and there were no complications     Patient was asked to rest bilateral knees for 3 days, patient states understanding and states will comply. Letter was written for patient to rest from work.    Followup: Return in about 6 months (around 12/7/2018). or sooner merritt Tesfaye was seen 30 minutes face-to-face of which more than 50% of the time was spent counseling the patient (excluding time for procedures)  regarding  rheumatological condition and care. Therapy was discussed in detail.    Please note that this dictation was created using voice recognition software. I have made every reasonable attempt to correct obvious errors, but I expect that there are errors of grammar and possibly content that I did not discover before finalizing the note.

## 2021-03-31 ENCOUNTER — IMMUNIZATION (OUTPATIENT)
Dept: FAMILY PLANNING/WOMEN'S HEALTH CLINIC | Facility: IMMUNIZATION CENTER | Age: 66
End: 2021-03-31
Payer: COMMERCIAL

## 2021-03-31 DIAGNOSIS — Z23 ENCOUNTER FOR VACCINATION: Primary | ICD-10-CM

## 2021-03-31 PROCEDURE — 0001A PFIZER SARS-COV-2 VACCINE: CPT

## 2021-03-31 PROCEDURE — 91300 PFIZER SARS-COV-2 VACCINE: CPT

## 2021-04-23 ENCOUNTER — IMMUNIZATION (OUTPATIENT)
Dept: FAMILY PLANNING/WOMEN'S HEALTH CLINIC | Facility: IMMUNIZATION CENTER | Age: 66
End: 2021-04-23
Attending: INTERNAL MEDICINE
Payer: COMMERCIAL

## 2021-04-23 DIAGNOSIS — Z23 ENCOUNTER FOR VACCINATION: Primary | ICD-10-CM

## 2021-04-23 PROCEDURE — 91300 PFIZER SARS-COV-2 VACCINE: CPT

## 2021-04-23 PROCEDURE — 0002A PFIZER SARS-COV-2 VACCINE: CPT

## 2023-08-24 ENCOUNTER — OFFICE VISIT (OUTPATIENT)
Dept: URGENT CARE | Facility: PHYSICIAN GROUP | Age: 68
End: 2023-08-24
Payer: COMMERCIAL

## 2023-08-24 VITALS
HEIGHT: 67 IN | TEMPERATURE: 96.8 F | BODY MASS INDEX: 41.48 KG/M2 | WEIGHT: 264.3 LBS | DIASTOLIC BLOOD PRESSURE: 84 MMHG | OXYGEN SATURATION: 96 % | RESPIRATION RATE: 16 BRPM | HEART RATE: 83 BPM | SYSTOLIC BLOOD PRESSURE: 124 MMHG

## 2023-08-24 DIAGNOSIS — R05.1 ACUTE COUGH: ICD-10-CM

## 2023-08-24 DIAGNOSIS — R09.81 NASAL CONGESTION: ICD-10-CM

## 2023-08-24 DIAGNOSIS — U07.1 COVID-19: ICD-10-CM

## 2023-08-24 LAB
FLUAV RNA SPEC QL NAA+PROBE: NEGATIVE
FLUBV RNA SPEC QL NAA+PROBE: NEGATIVE
RSV RNA SPEC QL NAA+PROBE: NEGATIVE
SARS-COV-2 RNA RESP QL NAA+PROBE: POSITIVE

## 2023-08-24 PROCEDURE — 0241U POCT CEPHEID COV-2, FLU A/B, RSV - PCR: CPT | Performed by: FAMILY MEDICINE

## 2023-08-24 PROCEDURE — 99213 OFFICE O/P EST LOW 20 MIN: CPT | Performed by: FAMILY MEDICINE

## 2023-08-24 PROCEDURE — 3079F DIAST BP 80-89 MM HG: CPT | Performed by: FAMILY MEDICINE

## 2023-08-24 PROCEDURE — 3074F SYST BP LT 130 MM HG: CPT | Performed by: FAMILY MEDICINE

## 2023-08-24 RX ORDER — DEXTROMETHORPHAN HYDROBROMIDE AND PROMETHAZINE HYDROCHLORIDE 15; 6.25 MG/5ML; MG/5ML
5 SYRUP ORAL 4 TIMES DAILY PRN
Qty: 120 ML | Refills: 0 | Status: SHIPPED | OUTPATIENT
Start: 2023-08-24 | End: 2023-08-24

## 2023-08-24 RX ORDER — DEXTROMETHORPHAN HYDROBROMIDE AND PROMETHAZINE HYDROCHLORIDE 15; 6.25 MG/5ML; MG/5ML
5 SYRUP ORAL 4 TIMES DAILY PRN
Qty: 120 ML | Refills: 0 | Status: SHIPPED | OUTPATIENT
Start: 2023-08-24

## 2023-08-24 ASSESSMENT — ENCOUNTER SYMPTOMS
MYALGIAS: 0
NAUSEA: 0
DIARRHEA: 1
EYE DISCHARGE: 0
VOMITING: 0
EYE REDNESS: 0
WEIGHT LOSS: 0

## 2023-08-24 NOTE — PROGRESS NOTES
"Subjective     Ray Tesfaye is a 67 y.o. male who presents with Illness (5days runny nose, cough, fever )            5 day productive cough without blood in sputum. Subjective fever/chills. No SOB/wheeze. Nasal congestion. Mild decrease in taste and smell. No other aggravating or alleviating factors.        Review of Systems   Constitutional:  Negative for malaise/fatigue and weight loss.   Eyes:  Negative for discharge and redness.   Gastrointestinal:  Positive for diarrhea (x1). Negative for nausea and vomiting.   Musculoskeletal:  Negative for joint pain and myalgias.   Skin:  Negative for itching and rash.              Objective     /84 (BP Location: Right arm, Patient Position: Sitting, BP Cuff Size: Adult)   Pulse 83   Temp 36 °C (96.8 °F) (Temporal)   Resp 16   Ht 1.702 m (5' 7\")   Wt 120 kg (264 lb 4.8 oz)   SpO2 96%   BMI 41.40 kg/m²      Physical Exam  Constitutional:       General: He is not in acute distress.     Appearance: He is well-developed.   HENT:      Head: Normocephalic and atraumatic.      Right Ear: Tympanic membrane normal.      Left Ear: Tympanic membrane normal.      Nose: Congestion present.      Mouth/Throat:      Mouth: Mucous membranes are moist.      Pharynx: Posterior oropharyngeal erythema present.   Eyes:      Conjunctiva/sclera: Conjunctivae normal.   Cardiovascular:      Rate and Rhythm: Normal rate and regular rhythm.      Heart sounds: Normal heart sounds. No murmur heard.  Pulmonary:      Effort: Pulmonary effort is normal.      Breath sounds: Normal breath sounds. No wheezing.   Musculoskeletal:      Cervical back: Neck supple.   Lymphadenopathy:      Cervical: No cervical adenopathy.   Skin:     General: Skin is warm and dry.      Findings: No rash.   Neurological:      Mental Status: He is alert.                             Assessment & Plan      Poct pcr C19 +    1. Acute cough  POCT CoV-2, Flu A/B, RSV by PCR    promethazine-dextromethorphan " (PROMETHAZINE-DM) 6.25-15 MG/5ML syrup    DISCONTINUED: promethazine-dextromethorphan (PROMETHAZINE-DM) 6.25-15 MG/5ML syrup      2. Nasal congestion  POCT CoV-2, Flu A/B, RSV by PCR      3. COVID-19  Nirmatrelvir&Ritonavir 300/100 20 x 150 MG & 10 x 100MG Tablet Therapy Pack    DISCONTINUED: Nirmatrelvir&Ritonavir 300/100 20 x 150 MG & 10 x 100MG Tablet Therapy Pack        Differential diagnosis, natural history, supportive care, and indications for immediate follow-up were discussed.

## 2024-05-19 ENCOUNTER — HOSPITAL ENCOUNTER (INPATIENT)
Facility: MEDICAL CENTER | Age: 69
LOS: 2 days | DRG: 322 | End: 2024-05-22
Attending: STUDENT IN AN ORGANIZED HEALTH CARE EDUCATION/TRAINING PROGRAM | Admitting: STUDENT IN AN ORGANIZED HEALTH CARE EDUCATION/TRAINING PROGRAM
Payer: COMMERCIAL

## 2024-05-19 ENCOUNTER — APPOINTMENT (OUTPATIENT)
Dept: RADIOLOGY | Facility: MEDICAL CENTER | Age: 69
DRG: 322 | End: 2024-05-19
Attending: STUDENT IN AN ORGANIZED HEALTH CARE EDUCATION/TRAINING PROGRAM
Payer: COMMERCIAL

## 2024-05-19 DIAGNOSIS — Z79.1 ENCOUNTER FOR LONG-TERM (CURRENT) USE OF NON-STEROIDAL ANTI-INFLAMMATORIES: ICD-10-CM

## 2024-05-19 DIAGNOSIS — I21.4 NSTEMI (NON-ST ELEVATED MYOCARDIAL INFARCTION) (HCC): ICD-10-CM

## 2024-05-19 LAB — EKG IMPRESSION: NORMAL

## 2024-05-19 PROCEDURE — 99285 EMERGENCY DEPT VISIT HI MDM: CPT

## 2024-05-19 PROCEDURE — 93005 ELECTROCARDIOGRAM TRACING: CPT

## 2024-05-19 PROCEDURE — 93005 ELECTROCARDIOGRAM TRACING: CPT | Performed by: STUDENT IN AN ORGANIZED HEALTH CARE EDUCATION/TRAINING PROGRAM

## 2024-05-19 PROCEDURE — 80053 COMPREHEN METABOLIC PANEL: CPT

## 2024-05-19 PROCEDURE — 85025 COMPLETE CBC W/AUTO DIFF WBC: CPT

## 2024-05-19 PROCEDURE — 36415 COLL VENOUS BLD VENIPUNCTURE: CPT

## 2024-05-19 PROCEDURE — 84484 ASSAY OF TROPONIN QUANT: CPT

## 2024-05-19 ASSESSMENT — PAIN DESCRIPTION - PAIN TYPE: TYPE: ACUTE PAIN

## 2024-05-19 ASSESSMENT — PAIN DESCRIPTION - DESCRIPTORS: DESCRIPTORS: PRESSURE

## 2024-05-20 ENCOUNTER — APPOINTMENT (OUTPATIENT)
Dept: CARDIOLOGY | Facility: MEDICAL CENTER | Age: 69
DRG: 322 | End: 2024-05-20
Attending: INTERNAL MEDICINE
Payer: COMMERCIAL

## 2024-05-20 ENCOUNTER — APPOINTMENT (OUTPATIENT)
Dept: RADIOLOGY | Facility: MEDICAL CENTER | Age: 69
DRG: 322 | End: 2024-05-20
Attending: STUDENT IN AN ORGANIZED HEALTH CARE EDUCATION/TRAINING PROGRAM
Payer: COMMERCIAL

## 2024-05-20 PROBLEM — Z87.891 FORMER HEAVY TOBACCO SMOKER: Status: ACTIVE | Noted: 2024-05-20

## 2024-05-20 PROBLEM — I20.0 UNSTABLE ANGINA (HCC): Status: ACTIVE | Noted: 2024-05-20

## 2024-05-20 PROBLEM — I21.4 NSTEMI (NON-ST ELEVATED MYOCARDIAL INFARCTION) (HCC): Status: ACTIVE | Noted: 2024-05-20

## 2024-05-20 PROBLEM — E66.01 CLASS 3 SEVERE OBESITY IN ADULT (HCC): Status: ACTIVE | Noted: 2024-05-20

## 2024-05-20 PROBLEM — R73.9 HYPERGLYCEMIA: Status: ACTIVE | Noted: 2024-05-20

## 2024-05-20 LAB
ALBUMIN SERPL BCP-MCNC: 4.2 G/DL (ref 3.2–4.9)
ALBUMIN SERPL BCP-MCNC: 4.3 G/DL (ref 3.2–4.9)
ALBUMIN/GLOB SERPL: 1.5 G/DL
ALP SERPL-CCNC: 88 U/L (ref 30–99)
ALT SERPL-CCNC: 43 U/L (ref 2–50)
ANION GAP SERPL CALC-SCNC: 15 MMOL/L (ref 7–16)
APTT PPP: 31.8 SEC (ref 24.7–36)
AST SERPL-CCNC: 44 U/L (ref 12–45)
BASOPHILS # BLD AUTO: 0.3 % (ref 0–1.8)
BASOPHILS # BLD AUTO: 0.6 % (ref 0–1.8)
BASOPHILS # BLD: 0.05 K/UL (ref 0–0.12)
BASOPHILS # BLD: 0.07 K/UL (ref 0–0.12)
BILIRUB SERPL-MCNC: 0.4 MG/DL (ref 0.1–1.5)
BUN SERPL-MCNC: 12 MG/DL (ref 8–22)
BUN SERPL-MCNC: 13 MG/DL (ref 8–22)
CALCIUM ALBUM COR SERPL-MCNC: 9.2 MG/DL (ref 8.5–10.5)
CALCIUM ALBUM COR SERPL-MCNC: 9.2 MG/DL (ref 8.5–10.5)
CALCIUM SERPL-MCNC: 9.4 MG/DL (ref 8.5–10.5)
CALCIUM SERPL-MCNC: 9.4 MG/DL (ref 8.5–10.5)
CHLORIDE SERPL-SCNC: 103 MMOL/L (ref 96–112)
CHLORIDE SERPL-SCNC: 103 MMOL/L (ref 96–112)
CHOLEST SERPL-MCNC: 271 MG/DL (ref 100–199)
CO2 SERPL-SCNC: 20 MMOL/L (ref 20–33)
CO2 SERPL-SCNC: 23 MMOL/L (ref 20–33)
CREAT SERPL-MCNC: 0.67 MG/DL (ref 0.5–1.4)
CREAT SERPL-MCNC: 0.92 MG/DL (ref 0.5–1.4)
EKG IMPRESSION: NORMAL
EOSINOPHIL # BLD AUTO: 0.03 K/UL (ref 0–0.51)
EOSINOPHIL # BLD AUTO: 0.12 K/UL (ref 0–0.51)
EOSINOPHIL NFR BLD: 0.2 % (ref 0–6.9)
EOSINOPHIL NFR BLD: 1 % (ref 0–6.9)
ERYTHROCYTE [DISTWIDTH] IN BLOOD BY AUTOMATED COUNT: 41.3 FL (ref 35.9–50)
ERYTHROCYTE [DISTWIDTH] IN BLOOD BY AUTOMATED COUNT: 42.1 FL (ref 35.9–50)
EST. AVERAGE GLUCOSE BLD GHB EST-MCNC: 137 MG/DL
ETHANOL BLD-MCNC: <10.1 MG/DL
GFR SERPLBLD CREATININE-BSD FMLA CKD-EPI: 103 ML/MIN/1.73 M 2
GFR SERPLBLD CREATININE-BSD FMLA CKD-EPI: 91 ML/MIN/1.73 M 2
GLOBULIN SER CALC-MCNC: 2.8 G/DL (ref 1.9–3.5)
GLUCOSE SERPL-MCNC: 170 MG/DL (ref 65–99)
GLUCOSE SERPL-MCNC: 191 MG/DL (ref 65–99)
HBA1C MFR BLD: 6.4 % (ref 4–5.6)
HCT VFR BLD AUTO: 44.6 % (ref 42–52)
HCT VFR BLD AUTO: 45.6 % (ref 42–52)
HDLC SERPL-MCNC: 45 MG/DL
HGB BLD-MCNC: 16 G/DL (ref 14–18)
HGB BLD-MCNC: 16.5 G/DL (ref 14–18)
IMM GRANULOCYTES # BLD AUTO: 0.04 K/UL (ref 0–0.11)
IMM GRANULOCYTES # BLD AUTO: 0.07 K/UL (ref 0–0.11)
IMM GRANULOCYTES NFR BLD AUTO: 0.3 % (ref 0–0.9)
IMM GRANULOCYTES NFR BLD AUTO: 0.4 % (ref 0–0.9)
INR PPP: 1.21 (ref 0.87–1.13)
LDLC SERPL CALC-MCNC: 199 MG/DL
LV EJECT FRACT MOD 2C 99903: 47.55
LV EJECT FRACT MOD 4C 99902: 48.11
LV EJECT FRACT MOD BP 99901: 48.81
LYMPHOCYTES # BLD AUTO: 2.34 K/UL (ref 1–4.8)
LYMPHOCYTES # BLD AUTO: 3.54 K/UL (ref 1–4.8)
LYMPHOCYTES NFR BLD: 14.6 % (ref 22–41)
LYMPHOCYTES NFR BLD: 29.5 % (ref 22–41)
MAGNESIUM SERPL-MCNC: 1.8 MG/DL (ref 1.5–2.5)
MCH RBC QN AUTO: 32 PG (ref 27–33)
MCH RBC QN AUTO: 32.7 PG (ref 27–33)
MCHC RBC AUTO-ENTMCNC: 35.9 G/DL (ref 32.3–36.5)
MCHC RBC AUTO-ENTMCNC: 36.2 G/DL (ref 32.3–36.5)
MCV RBC AUTO: 89.2 FL (ref 81.4–97.8)
MCV RBC AUTO: 90.5 FL (ref 81.4–97.8)
MONOCYTES # BLD AUTO: 0.91 K/UL (ref 0–0.85)
MONOCYTES # BLD AUTO: 1.07 K/UL (ref 0–0.85)
MONOCYTES NFR BLD AUTO: 6.7 % (ref 0–13.4)
MONOCYTES NFR BLD AUTO: 7.6 % (ref 0–13.4)
NEUTROPHILS # BLD AUTO: 12.46 K/UL (ref 1.82–7.42)
NEUTROPHILS # BLD AUTO: 7.34 K/UL (ref 1.82–7.42)
NEUTROPHILS NFR BLD: 61 % (ref 44–72)
NEUTROPHILS NFR BLD: 77.8 % (ref 44–72)
NRBC # BLD AUTO: 0 K/UL
NRBC # BLD AUTO: 0 K/UL
NRBC BLD-RTO: 0 /100 WBC (ref 0–0.2)
NRBC BLD-RTO: 0 /100 WBC (ref 0–0.2)
NT-PROBNP SERPL IA-MCNC: 309 PG/ML (ref 0–125)
PHOSPHATE SERPL-MCNC: 2.3 MG/DL (ref 2.5–4.5)
PLATELET # BLD AUTO: 219 K/UL (ref 164–446)
PLATELET # BLD AUTO: 222 K/UL (ref 164–446)
PMV BLD AUTO: 9.3 FL (ref 9–12.9)
PMV BLD AUTO: 9.4 FL (ref 9–12.9)
POTASSIUM SERPL-SCNC: 4 MMOL/L (ref 3.6–5.5)
POTASSIUM SERPL-SCNC: 4.1 MMOL/L (ref 3.6–5.5)
PROT SERPL-MCNC: 7.1 G/DL (ref 6–8.2)
PROTHROMBIN TIME: 15.4 SEC (ref 12–14.6)
RBC # BLD AUTO: 5 M/UL (ref 4.7–6.1)
RBC # BLD AUTO: 5.04 M/UL (ref 4.7–6.1)
SODIUM SERPL-SCNC: 137 MMOL/L (ref 135–145)
SODIUM SERPL-SCNC: 141 MMOL/L (ref 135–145)
TRIGL SERPL-MCNC: 133 MG/DL (ref 0–149)
TROPONIN T SERPL-MCNC: 219 NG/L (ref 6–19)
TROPONIN T SERPL-MCNC: 22 NG/L (ref 6–19)
TROPONIN T SERPL-MCNC: 3470 NG/L (ref 6–19)
UFH PPP CHRO-ACNC: <0.1 IU/ML
WBC # BLD AUTO: 12 K/UL (ref 4.8–10.8)
WBC # BLD AUTO: 16 K/UL (ref 4.8–10.8)

## 2024-05-20 PROCEDURE — 85730 THROMBOPLASTIN TIME PARTIAL: CPT

## 2024-05-20 PROCEDURE — 83880 ASSAY OF NATRIURETIC PEPTIDE: CPT

## 2024-05-20 PROCEDURE — 82077 ASSAY SPEC XCP UR&BREATH IA: CPT

## 2024-05-20 PROCEDURE — 92943 PRQ TRLUML REVSC CH OCC ANT: CPT | Mod: LD | Performed by: INTERNAL MEDICINE

## 2024-05-20 PROCEDURE — 700102 HCHG RX REV CODE 250 W/ 637 OVERRIDE(OP): Performed by: INTERNAL MEDICINE

## 2024-05-20 PROCEDURE — C1769 GUIDE WIRE: HCPCS

## 2024-05-20 PROCEDURE — 80061 LIPID PANEL: CPT

## 2024-05-20 PROCEDURE — 85520 HEPARIN ASSAY: CPT

## 2024-05-20 PROCEDURE — 96365 THER/PROPH/DIAG IV INF INIT: CPT

## 2024-05-20 PROCEDURE — B240ZZ3 ULTRASONOGRAPHY OF SINGLE CORONARY ARTERY, INTRAVASCULAR: ICD-10-PCS | Performed by: INTERNAL MEDICINE

## 2024-05-20 PROCEDURE — 85025 COMPLETE CBC W/AUTO DIFF WBC: CPT

## 2024-05-20 PROCEDURE — 93005 ELECTROCARDIOGRAM TRACING: CPT | Performed by: HOSPITALIST

## 2024-05-20 PROCEDURE — 36415 COLL VENOUS BLD VENIPUNCTURE: CPT

## 2024-05-20 PROCEDURE — 93306 TTE W/DOPPLER COMPLETE: CPT | Mod: 26 | Performed by: STUDENT IN AN ORGANIZED HEALTH CARE EDUCATION/TRAINING PROGRAM

## 2024-05-20 PROCEDURE — 80069 RENAL FUNCTION PANEL: CPT

## 2024-05-20 PROCEDURE — A9270 NON-COVERED ITEM OR SERVICE: HCPCS | Performed by: STUDENT IN AN ORGANIZED HEALTH CARE EDUCATION/TRAINING PROGRAM

## 2024-05-20 PROCEDURE — 700111 HCHG RX REV CODE 636 W/ 250 OVERRIDE (IP): Mod: JZ

## 2024-05-20 PROCEDURE — 700101 HCHG RX REV CODE 250

## 2024-05-20 PROCEDURE — 93010 ELECTROCARDIOGRAM REPORT: CPT | Mod: 77 | Performed by: STUDENT IN AN ORGANIZED HEALTH CARE EDUCATION/TRAINING PROGRAM

## 2024-05-20 PROCEDURE — 700102 HCHG RX REV CODE 250 W/ 637 OVERRIDE(OP)

## 2024-05-20 PROCEDURE — 700102 HCHG RX REV CODE 250 W/ 637 OVERRIDE(OP): Performed by: STUDENT IN AN ORGANIZED HEALTH CARE EDUCATION/TRAINING PROGRAM

## 2024-05-20 PROCEDURE — 4A023N7 MEASUREMENT OF CARDIAC SAMPLING AND PRESSURE, LEFT HEART, PERCUTANEOUS APPROACH: ICD-10-PCS | Performed by: INTERNAL MEDICINE

## 2024-05-20 PROCEDURE — A9270 NON-COVERED ITEM OR SERVICE: HCPCS

## 2024-05-20 PROCEDURE — 99222 1ST HOSP IP/OBS MODERATE 55: CPT | Performed by: INTERNAL MEDICINE

## 2024-05-20 PROCEDURE — 92978 ENDOLUMINL IVUS OCT C 1ST: CPT | Mod: 26,LD | Performed by: INTERNAL MEDICINE

## 2024-05-20 PROCEDURE — 700117 HCHG RX CONTRAST REV CODE 255: Performed by: INTERNAL MEDICINE

## 2024-05-20 PROCEDURE — 99291 CRITICAL CARE FIRST HOUR: CPT | Performed by: STUDENT IN AN ORGANIZED HEALTH CARE EDUCATION/TRAINING PROGRAM

## 2024-05-20 PROCEDURE — 93005 ELECTROCARDIOGRAM TRACING: CPT | Performed by: INTERNAL MEDICINE

## 2024-05-20 PROCEDURE — 93010 ELECTROCARDIOGRAM REPORT: CPT | Mod: 76 | Performed by: STUDENT IN AN ORGANIZED HEALTH CARE EDUCATION/TRAINING PROGRAM

## 2024-05-20 PROCEDURE — 93458 L HRT ARTERY/VENTRICLE ANGIO: CPT | Mod: 26,59 | Performed by: INTERNAL MEDICINE

## 2024-05-20 PROCEDURE — 700117 HCHG RX CONTRAST REV CODE 255: Performed by: STUDENT IN AN ORGANIZED HEALTH CARE EDUCATION/TRAINING PROGRAM

## 2024-05-20 PROCEDURE — B2151ZZ FLUOROSCOPY OF LEFT HEART USING LOW OSMOLAR CONTRAST: ICD-10-PCS | Performed by: INTERNAL MEDICINE

## 2024-05-20 PROCEDURE — 770020 HCHG ROOM/CARE - TELE (206)

## 2024-05-20 PROCEDURE — 93306 TTE W/DOPPLER COMPLETE: CPT

## 2024-05-20 PROCEDURE — 700111 HCHG RX REV CODE 636 W/ 250 OVERRIDE (IP): Performed by: STUDENT IN AN ORGANIZED HEALTH CARE EDUCATION/TRAINING PROGRAM

## 2024-05-20 PROCEDURE — 99291 CRITICAL CARE FIRST HOUR: CPT | Performed by: HOSPITALIST

## 2024-05-20 PROCEDURE — 71275 CT ANGIOGRAPHY CHEST: CPT

## 2024-05-20 PROCEDURE — 700117 HCHG RX CONTRAST REV CODE 255: Performed by: HOSPITALIST

## 2024-05-20 PROCEDURE — A9270 NON-COVERED ITEM OR SERVICE: HCPCS | Performed by: INTERNAL MEDICINE

## 2024-05-20 PROCEDURE — 71045 X-RAY EXAM CHEST 1 VIEW: CPT

## 2024-05-20 PROCEDURE — 99152 MOD SED SAME PHYS/QHP 5/>YRS: CPT | Performed by: INTERNAL MEDICINE

## 2024-05-20 PROCEDURE — 83735 ASSAY OF MAGNESIUM: CPT

## 2024-05-20 PROCEDURE — 99223 1ST HOSP IP/OBS HIGH 75: CPT | Mod: 25 | Performed by: INTERNAL MEDICINE

## 2024-05-20 PROCEDURE — 027034Z DILATION OF CORONARY ARTERY, ONE ARTERY WITH DRUG-ELUTING INTRALUMINAL DEVICE, PERCUTANEOUS APPROACH: ICD-10-PCS | Performed by: INTERNAL MEDICINE

## 2024-05-20 PROCEDURE — B2111ZZ FLUOROSCOPY OF MULTIPLE CORONARY ARTERIES USING LOW OSMOLAR CONTRAST: ICD-10-PCS | Performed by: INTERNAL MEDICINE

## 2024-05-20 PROCEDURE — 85610 PROTHROMBIN TIME: CPT

## 2024-05-20 PROCEDURE — 84484 ASSAY OF TROPONIN QUANT: CPT

## 2024-05-20 PROCEDURE — 83036 HEMOGLOBIN GLYCOSYLATED A1C: CPT

## 2024-05-20 RX ORDER — HEPARIN SODIUM 1000 [USP'U]/ML
2000 INJECTION, SOLUTION INTRAVENOUS; SUBCUTANEOUS PRN
Status: DISCONTINUED | OUTPATIENT
Start: 2024-05-20 | End: 2024-05-20

## 2024-05-20 RX ORDER — LIDOCAINE HYDROCHLORIDE 20 MG/ML
INJECTION, SOLUTION INFILTRATION; PERINEURAL
Status: COMPLETED
Start: 2024-05-20 | End: 2024-05-20

## 2024-05-20 RX ORDER — HEPARIN SODIUM 1000 [USP'U]/ML
INJECTION, SOLUTION INTRAVENOUS; SUBCUTANEOUS
Status: COMPLETED
Start: 2024-05-20 | End: 2024-05-20

## 2024-05-20 RX ORDER — METOPROLOL TARTRATE 25 MG/1
25 TABLET, FILM COATED ORAL 2 TIMES DAILY
Status: DISCONTINUED | OUTPATIENT
Start: 2024-05-20 | End: 2024-05-20

## 2024-05-20 RX ORDER — HEPARIN SODIUM 5000 [USP'U]/100ML
0-30 INJECTION, SOLUTION INTRAVENOUS CONTINUOUS
Status: DISCONTINUED | OUTPATIENT
Start: 2024-05-20 | End: 2024-05-20

## 2024-05-20 RX ORDER — TAMSULOSIN HYDROCHLORIDE 0.4 MG/1
0.4 CAPSULE ORAL DAILY
COMMUNITY

## 2024-05-20 RX ORDER — ATORVASTATIN CALCIUM 40 MG/1
40 TABLET, FILM COATED ORAL EVERY EVENING
Status: DISCONTINUED | OUTPATIENT
Start: 2024-05-20 | End: 2024-05-21

## 2024-05-20 RX ORDER — ONDANSETRON 2 MG/ML
4 INJECTION INTRAMUSCULAR; INTRAVENOUS EVERY 4 HOURS PRN
Status: DISCONTINUED | OUTPATIENT
Start: 2024-05-20 | End: 2024-05-22 | Stop reason: HOSPADM

## 2024-05-20 RX ORDER — POLYETHYLENE GLYCOL 3350 17 G/17G
1 POWDER, FOR SOLUTION ORAL
Status: DISCONTINUED | OUTPATIENT
Start: 2024-05-20 | End: 2024-05-22 | Stop reason: HOSPADM

## 2024-05-20 RX ORDER — LABETALOL HYDROCHLORIDE 5 MG/ML
10 INJECTION, SOLUTION INTRAVENOUS EVERY 4 HOURS PRN
Status: DISCONTINUED | OUTPATIENT
Start: 2024-05-20 | End: 2024-05-22 | Stop reason: HOSPADM

## 2024-05-20 RX ORDER — ONDANSETRON 4 MG/1
4 TABLET, ORALLY DISINTEGRATING ORAL EVERY 4 HOURS PRN
Status: DISCONTINUED | OUTPATIENT
Start: 2024-05-20 | End: 2024-05-22 | Stop reason: HOSPADM

## 2024-05-20 RX ORDER — IPRATROPIUM BROMIDE AND ALBUTEROL SULFATE 2.5; .5 MG/3ML; MG/3ML
3 SOLUTION RESPIRATORY (INHALATION)
Status: DISCONTINUED | OUTPATIENT
Start: 2024-05-20 | End: 2024-05-22 | Stop reason: HOSPADM

## 2024-05-20 RX ORDER — IBUPROFEN 200 MG
200 TABLET ORAL
Status: ON HOLD | COMMUNITY
End: 2024-05-21

## 2024-05-20 RX ORDER — MIDAZOLAM HYDROCHLORIDE 1 MG/ML
INJECTION INTRAMUSCULAR; INTRAVENOUS
Status: COMPLETED
Start: 2024-05-20 | End: 2024-05-20

## 2024-05-20 RX ORDER — TAMSULOSIN HYDROCHLORIDE 0.4 MG/1
0.4 CAPSULE ORAL DAILY
Status: DISCONTINUED | OUTPATIENT
Start: 2024-05-20 | End: 2024-05-22 | Stop reason: HOSPADM

## 2024-05-20 RX ORDER — MORPHINE SULFATE 4 MG/ML
2-4 INJECTION INTRAVENOUS
Status: DISCONTINUED | OUTPATIENT
Start: 2024-05-20 | End: 2024-05-21

## 2024-05-20 RX ORDER — METOPROLOL TARTRATE 50 MG
50 TABLET ORAL 2 TIMES DAILY
Status: DISCONTINUED | OUTPATIENT
Start: 2024-05-20 | End: 2024-05-21

## 2024-05-20 RX ORDER — PRASUGREL 10 MG/1
60 TABLET, FILM COATED ORAL ONCE
Status: DISCONTINUED | OUTPATIENT
Start: 2024-05-20 | End: 2024-05-20

## 2024-05-20 RX ORDER — AMOXICILLIN 250 MG
2 CAPSULE ORAL EVERY EVENING
Status: DISCONTINUED | OUTPATIENT
Start: 2024-05-20 | End: 2024-05-22 | Stop reason: HOSPADM

## 2024-05-20 RX ORDER — ASPIRIN 81 MG/1
81 TABLET ORAL DAILY
Status: DISCONTINUED | OUTPATIENT
Start: 2024-05-20 | End: 2024-05-22 | Stop reason: HOSPADM

## 2024-05-20 RX ORDER — HEPARIN SODIUM 1000 [USP'U]/ML
4000 INJECTION, SOLUTION INTRAVENOUS; SUBCUTANEOUS ONCE
Status: DISCONTINUED | OUTPATIENT
Start: 2024-05-20 | End: 2024-05-20

## 2024-05-20 RX ORDER — PRASUGREL 10 MG/1
TABLET, FILM COATED ORAL
Status: COMPLETED
Start: 2024-05-20 | End: 2024-05-20

## 2024-05-20 RX ORDER — SODIUM CHLORIDE 9 MG/ML
3 INJECTION, SOLUTION INTRAVENOUS CONTINUOUS
Status: ACTIVE | OUTPATIENT
Start: 2024-05-20 | End: 2024-05-20

## 2024-05-20 RX ORDER — PRASUGREL 10 MG/1
10 TABLET, FILM COATED ORAL DAILY
Status: DISCONTINUED | OUTPATIENT
Start: 2024-05-21 | End: 2024-05-22 | Stop reason: HOSPADM

## 2024-05-20 RX ORDER — ASPIRIN 81 MG/1
81 TABLET ORAL DAILY
Status: DISCONTINUED | OUTPATIENT
Start: 2024-05-21 | End: 2024-05-20

## 2024-05-20 RX ORDER — HEPARIN SODIUM 200 [USP'U]/100ML
INJECTION, SOLUTION INTRAVENOUS
Status: COMPLETED
Start: 2024-05-20 | End: 2024-05-20

## 2024-05-20 RX ORDER — BIVALIRUDIN 250 MG/5ML
INJECTION, POWDER, LYOPHILIZED, FOR SOLUTION INTRAVENOUS
Status: COMPLETED
Start: 2024-05-20 | End: 2024-05-20

## 2024-05-20 RX ORDER — ACETAMINOPHEN 325 MG/1
650 TABLET ORAL EVERY 6 HOURS PRN
Status: DISCONTINUED | OUTPATIENT
Start: 2024-05-20 | End: 2024-05-22 | Stop reason: HOSPADM

## 2024-05-20 RX ORDER — NITROGLYCERIN 0.4 MG/1
0.4 TABLET SUBLINGUAL
Status: COMPLETED | OUTPATIENT
Start: 2024-05-20 | End: 2024-05-20

## 2024-05-20 RX ORDER — NITROGLYCERIN 0.4 MG/1
0.4 TABLET SUBLINGUAL
Status: DISCONTINUED | OUTPATIENT
Start: 2024-05-20 | End: 2024-05-22 | Stop reason: HOSPADM

## 2024-05-20 RX ORDER — VERAPAMIL HYDROCHLORIDE 2.5 MG/ML
INJECTION, SOLUTION INTRAVENOUS
Status: COMPLETED
Start: 2024-05-20 | End: 2024-05-20

## 2024-05-20 RX ADMIN — IOHEXOL 130 ML: 350 INJECTION, SOLUTION INTRAVENOUS at 11:46

## 2024-05-20 RX ADMIN — HEPARIN SODIUM: 1000 INJECTION, SOLUTION INTRAVENOUS; SUBCUTANEOUS at 10:42

## 2024-05-20 RX ADMIN — FENTANYL CITRATE 100 MCG: 50 INJECTION, SOLUTION INTRAMUSCULAR; INTRAVENOUS at 11:14

## 2024-05-20 RX ADMIN — ATORVASTATIN CALCIUM 40 MG: 40 TABLET, FILM COATED ORAL at 17:07

## 2024-05-20 RX ADMIN — FENTANYL CITRATE 50 MCG: 50 INJECTION, SOLUTION INTRAMUSCULAR; INTRAVENOUS at 11:46

## 2024-05-20 RX ADMIN — LIDOCAINE HYDROCHLORIDE: 20 INJECTION, SOLUTION INFILTRATION; PERINEURAL at 10:41

## 2024-05-20 RX ADMIN — HEPARIN SODIUM 12 UNITS/KG/HR: 5000 INJECTION, SOLUTION INTRAVENOUS at 03:55

## 2024-05-20 RX ADMIN — NITROGLYCERIN 10 ML: 20 INJECTION INTRAVENOUS at 10:42

## 2024-05-20 RX ADMIN — IOHEXOL 194 ML: 350 INJECTION, SOLUTION INTRAVENOUS at 01:30

## 2024-05-20 RX ADMIN — BIVALIRUDIN 1.75 MG/KG/HR: 250 INJECTION, POWDER, LYOPHILIZED, FOR SOLUTION INTRAVENOUS at 11:27

## 2024-05-20 RX ADMIN — TAMSULOSIN HYDROCHLORIDE 0.4 MG: 0.4 CAPSULE ORAL at 05:25

## 2024-05-20 RX ADMIN — NITROGLYCERIN 0.4 MG: 0.4 TABLET, ORALLY DISINTEGRATING SUBLINGUAL at 00:34

## 2024-05-20 RX ADMIN — PRASUGREL 60 MG: 10 TABLET, FILM COATED ORAL at 11:46

## 2024-05-20 RX ADMIN — OMEPRAZOLE 20 MG: 20 CAPSULE, DELAYED RELEASE ORAL at 17:07

## 2024-05-20 RX ADMIN — MIDAZOLAM HYDROCHLORIDE 2 MG: 2 INJECTION, SOLUTION INTRAMUSCULAR; INTRAVENOUS at 11:13

## 2024-05-20 RX ADMIN — METOPROLOL TARTRATE 50 MG: 50 TABLET, FILM COATED ORAL at 06:22

## 2024-05-20 RX ADMIN — HEPARIN SODIUM 12 UNITS/KG/HR: 5000 INJECTION, SOLUTION INTRAVENOUS at 03:06

## 2024-05-20 RX ADMIN — ASPIRIN 81 MG: 81 TABLET, COATED ORAL at 13:30

## 2024-05-20 RX ADMIN — BIVALIRUDIN 86.25 MG: 250 INJECTION, POWDER, LYOPHILIZED, FOR SOLUTION INTRAVENOUS at 11:26

## 2024-05-20 RX ADMIN — VERAPAMIL HYDROCHLORIDE 2.5 MG: 2.5 INJECTION, SOLUTION INTRAVENOUS at 10:41

## 2024-05-20 RX ADMIN — HEPARIN SODIUM 2000 UNITS: 200 INJECTION, SOLUTION INTRAVENOUS at 10:41

## 2024-05-20 RX ADMIN — OMEPRAZOLE 20 MG: 20 CAPSULE, DELAYED RELEASE ORAL at 05:25

## 2024-05-20 RX ADMIN — METOPROLOL TARTRATE 25 MG: 25 TABLET, FILM COATED ORAL at 04:37

## 2024-05-20 RX ADMIN — METOPROLOL TARTRATE 50 MG: 50 TABLET, FILM COATED ORAL at 17:07

## 2024-05-20 RX ADMIN — HUMAN ALBUMIN MICROSPHERES AND PERFLUTREN 3 ML: 10; .22 INJECTION, SOLUTION INTRAVENOUS at 16:30

## 2024-05-20 ASSESSMENT — COGNITIVE AND FUNCTIONAL STATUS - GENERAL
SUGGESTED CMS G CODE MODIFIER DAILY ACTIVITY: CH
MOVING FROM LYING ON BACK TO SITTING ON SIDE OF FLAT BED: A LITTLE
MOBILITY SCORE: 21
SUGGESTED CMS G CODE MODIFIER MOBILITY: CJ
CLIMB 3 TO 5 STEPS WITH RAILING: A LITTLE
DAILY ACTIVITIY SCORE: 24
TURNING FROM BACK TO SIDE WHILE IN FLAT BAD: A LITTLE

## 2024-05-20 ASSESSMENT — LIFESTYLE VARIABLES
TOTAL SCORE: 0
AVERAGE NUMBER OF DAYS PER WEEK YOU HAVE A DRINK CONTAINING ALCOHOL: 0
HOW MANY TIMES IN THE PAST YEAR HAVE YOU HAD 5 OR MORE DRINKS IN A DAY: 0
EVER HAD A DRINK FIRST THING IN THE MORNING TO STEADY YOUR NERVES TO GET RID OF A HANGOVER: NO
HAVE PEOPLE ANNOYED YOU BY CRITICIZING YOUR DRINKING: NO
DOES PATIENT WANT TO STOP DRINKING: NO
TOTAL SCORE: 0
SUBSTANCE_ABUSE: 0
ON A TYPICAL DAY WHEN YOU DRINK ALCOHOL HOW MANY DRINKS DO YOU HAVE: 0
EVER FELT BAD OR GUILTY ABOUT YOUR DRINKING: NO
ALCOHOL_USE: YES
TOTAL SCORE: 0
HAVE YOU EVER FELT YOU SHOULD CUT DOWN ON YOUR DRINKING: NO
CONSUMPTION TOTAL: NEGATIVE

## 2024-05-20 ASSESSMENT — ENCOUNTER SYMPTOMS
FEVER: 0
DIZZINESS: 0
HEMOPTYSIS: 0
PALPITATIONS: 1
HEADACHES: 0
VOMITING: 0
PALPITATIONS: 0
CLAUDICATION: 0
DEPRESSION: 0
NAUSEA: 0
CHILLS: 0
WHEEZING: 0
BRUISES/BLEEDS EASILY: 0
ABDOMINAL PAIN: 0
MYALGIAS: 0
MYALGIAS: 1
SHORTNESS OF BREATH: 0
BACK PAIN: 0
HEARTBURN: 0
DOUBLE VISION: 0
COUGH: 0
PND: 0
WEAKNESS: 1
BLURRED VISION: 0

## 2024-05-20 ASSESSMENT — PAIN DESCRIPTION - PAIN TYPE
TYPE: ACUTE PAIN
TYPE: SURGICAL PAIN

## 2024-05-20 ASSESSMENT — PATIENT HEALTH QUESTIONNAIRE - PHQ9
SUM OF ALL RESPONSES TO PHQ9 QUESTIONS 1 AND 2: 0
2. FEELING DOWN, DEPRESSED, IRRITABLE, OR HOPELESS: NOT AT ALL
1. LITTLE INTEREST OR PLEASURE IN DOING THINGS: NOT AT ALL

## 2024-05-20 ASSESSMENT — FIBROSIS 4 INDEX
FIB4 SCORE: 1.99

## 2024-05-20 ASSESSMENT — SOCIAL DETERMINANTS OF HEALTH (SDOH)
WITHIN THE LAST YEAR, HAVE TO BEEN RAPED OR FORCED TO HAVE ANY KIND OF SEXUAL ACTIVITY BY YOUR PARTNER OR EX-PARTNER?: NO
WITHIN THE LAST YEAR, HAVE YOU BEEN KICKED, HIT, SLAPPED, OR OTHERWISE PHYSICALLY HURT BY YOUR PARTNER OR EX-PARTNER?: NO
WITHIN THE LAST YEAR, HAVE YOU BEEN AFRAID OF YOUR PARTNER OR EX-PARTNER?: NO
WITHIN THE LAST YEAR, HAVE YOU BEEN HUMILIATED OR EMOTIONALLY ABUSED IN OTHER WAYS BY YOUR PARTNER OR EX-PARTNER?: NO

## 2024-05-20 NOTE — PROGRESS NOTES
[facility] Medicine Daily Progress Note    Date of Service  5/20/2024    Chief Complaint  Ray Tesfaye is a 68 y.o. male admitted 5/19/2024 with chest pain.     Hospital Course  Ray Tesfaye is a 68 y.o. morbidly obese male with history of tobacco abuse (quit 01/2024), arthritis who presented 5/19/2024 with evaluation for chest pain.  History obtained with the assistance of patient's son at bedside in ER as patient is poor historian. Patient reported having sudden onset of left-sided chest discomfort while he was sitting down at the end of her dinner meal.  He also reported having heavy sensation of upper extremities.  His wife call patient's son, who subsequently called 911.  Initially brought to ER by EMS for STEMI.  However, evaluated by cardiology (Dr. Soares) in ER, STEMI alert aborted asked criteria not met.  No acute finding on thoracoabdominal scan, initial troponin T 22, increased to 219 on repeat.  Patient has been initiated on heparin drip.  Admission requested by ERP.  Therefore, admitted to medicine service for further evaluation treatment.     Interval Problem Update  5/20 patient is new to me today, patient in bed mild chest discomfort earlier today, no sob, able to talk in full sentences. Discussed with cardio, nurse staff, , reviewed H&P, ER doctor note, reviewed EKG, cxr.     I have discussed this patient's plan of care and discharge plan at IDT rounds today with Case Management, Nursing, Nursing leadership, and other members of the IDT team.    Consultants/Specialty  cardiology    Code Status  Full Code    Disposition  The patient is not medically cleared for discharge to home or a post-acute facility.      I have placed the appropriate orders for post-discharge needs.    Review of Systems  Review of Systems   Constitutional:  Negative for chills and fever.   Eyes:  Negative for blurred vision and double vision.   Respiratory:  Negative for cough, hemoptysis and wheezing.     Cardiovascular:  Positive for chest pain. Negative for palpitations, claudication, leg swelling and PND.   Gastrointestinal:  Negative for heartburn, nausea and vomiting.   Genitourinary:  Negative for hematuria and urgency.   Musculoskeletal:  Negative for back pain and myalgias.   Skin:  Negative for rash.   Neurological:  Negative for dizziness and headaches.   Endo/Heme/Allergies:  Does not bruise/bleed easily.   Psychiatric/Behavioral:  Negative for depression.         Physical Exam  Temp:  [36.6 °C (97.9 °F)-37 °C (98.6 °F)] 37 °C (98.6 °F)  Pulse:  [62-80] 68  Resp:  [18-29] 18  BP: (115-164)/(67-85) 132/82  SpO2:  [93 %-96 %] 93 %    Physical Exam  Vitals and nursing note reviewed.   Constitutional:       Appearance: He is ill-appearing.   Eyes:      General: No scleral icterus.        Right eye: No discharge.         Left eye: No discharge.      Conjunctiva/sclera: Conjunctivae normal.   Cardiovascular:      Rate and Rhythm: Normal rate and regular rhythm.      Pulses: Normal pulses.      Heart sounds: Normal heart sounds.   Pulmonary:      Effort: Pulmonary effort is normal. No respiratory distress.      Breath sounds: Normal breath sounds.   Abdominal:      General: Bowel sounds are normal. There is no distension.      Tenderness: There is no abdominal tenderness. There is no guarding.   Musculoskeletal:         General: Normal range of motion.      Cervical back: Normal range of motion and neck supple.      Right lower leg: No edema.      Left lower leg: No edema.   Skin:     General: Skin is warm and dry.      Capillary Refill: Capillary refill takes less than 2 seconds.      Coloration: Skin is not jaundiced.   Neurological:      General: No focal deficit present.      Mental Status: He is alert and oriented to person, place, and time.      Cranial Nerves: No cranial nerve deficit.   Psychiatric:         Mood and Affect: Mood normal.         Behavior: Behavior normal.         Fluids  No intake or  output data in the 24 hours ending 05/20/24 1053    Laboratory  Recent Labs     05/19/24 2345 05/20/24  0751   WBC 12.0* 16.0*   RBC 5.04 5.00   HEMOGLOBIN 16.5 16.0   HEMATOCRIT 45.6 44.6   MCV 90.5 89.2   MCH 32.7 32.0   MCHC 36.2 35.9   RDW 42.1 41.3   PLATELETCT 222 219   MPV 9.3 9.4     Recent Labs     05/19/24  2345 05/20/24  0751   SODIUM 141 137   POTASSIUM 4.0 4.1   CHLORIDE 103 103   CO2 23 20   GLUCOSE 191* 170*   BUN 13 12   CREATININE 0.92 0.67   CALCIUM 9.4 9.4     Recent Labs     05/20/24  0251   APTT 31.8   INR 1.21*         Recent Labs     05/20/24  0751   TRIGLYCERIDE 133   HDL 45   *       Imaging  CT-CTA COMPLETE THORACOABDOMINAL AORTA   Final Result         1.  No aortic aneurysm or dissection identified.         DX-CHEST-PORTABLE (1 VIEW)   Final Result         1.  No acute cardiopulmonary disease.      CL-LEFT HEART CATHETERIZATION WITH POSSIBLE INTERVENTION    (Results Pending)   EC-ECHOCARDIOGRAM COMPLETE W/O CONT    (Results Pending)        Assessment/Plan  * NSTEMI (non-ST elevated myocardial infarction) ([facility])- (present on admission)  Assessment & Plan  Troponin T 22 >> 219  Trend CE, EKG  CTA complete thoracoabdominal scan: No dissection, no PE    Heparin drip  ASA/statin  As needed nitro SL, morphine  Check echo  Consult cardiology in a.m.    Troponin went up to 347, concern for possible STEMI, stat EKG, discussed with cardiology team,discussed with bed side nurse, continue heparin drip, patient will require cath today. Continue monitoring telemetry.     Former heavy tobacco smoker  Assessment & Plan  Quit smoking 01/2024  Continue     Class 3 severe obesity in adult (HCC)  Assessment & Plan  Diet and lifestyle modification  Body mass index is 41.35 kg/m².      Unstable angina ([facility])  Assessment & Plan  Plan as above    Hyperglycemia  Assessment & Plan  Check HbA1c    Lumbar disc disease- (present on admission)  Assessment & Plan  Per history  Supportive care    GERD  (gastroesophageal reflux disease)- (present on admission)  Assessment & Plan  Omeprazole         VTE prophylaxis: heparin    I have performed a physical exam and reviewed and updated ROS and Plan today (5/20/2024). In review of yesterday's note (5/19/2024), there are no changes except as documented above.      Patient is critically ill.   The patient continues to have: chest discomfort, increasing troponin.  The vital organ system that is affected is the: cardiovascular If untreated there is a high chance of deterioration into: cardiac arrest, cardiac shock and eventually death.   The critical care that I am providing today is: evaluated patient at bedside, reviewed EKG with cardiologist team, reviewed troponin level with is trending up from 219 to 3470, patient will require cardiac cath, patient on heparin drip, monitoring for side effects, including but not limited to acute bleeding, thrombocytopenia, nitroglycerin. Discussed with bedside nurse, concern for STEMI. Requires cardiac cath.   The critical that has been undertaken is medically complex.   There has been no overlap in critical care time.   Critical Care Time not including procedures: 35 minutes.

## 2024-05-20 NOTE — ED NOTES
"Med rec complete per patient/son at bedside  Allergies reviewed.   Outpatient antibiotics in the last 30 days? No   Anticoagulants taken in the last 14 days? No     *Flomax prescribed 1 capsule QD, patient reports he takes \"every other or every few days\" due to not having physical capability to rush to restroom    Pharmacy patient utilizes: [facility] on [address] Ayde Ch, CPhT    "

## 2024-05-20 NOTE — PROGRESS NOTES
4 Eyes Skin Assessment Completed by EVETTE Prabhakar and JAMIE Kim.    Head Redness  Ears WDL  Nose WDL  Mouth WDL  Neck WDL  Breast/Chest WDL  Shoulder Blades Redness and Blanching  Spine Redness and Blanching  (R) Arm/Elbow/Hand WDL  (L) Arm/Elbow/Hand WDL  Abdomen WDL  Groin Redness and Excoriation  Scrotum/Coccyx/Buttocks Redness and Blanching  (R) Leg WDL  (L) Leg WDL  (R) Heel/Foot/Toe WDL  (L) Heel/Foot/Toe WDL          Devices In Places Tele Box, Blood Pressure Cuff, and Pulse Ox      Interventions In Place Pillows    Possible Skin Injury No    Pictures Uploaded Into Epic Yes  Wound Consult Placed N/A  RN Wound Prevention Protocol Ordered No

## 2024-05-20 NOTE — ED TRIAGE NOTES
Chief Complaint   Patient presents with    Chest Pain     Pt report left side chest pressure 8/10 x last night. Denies SOB. Denies Cough, denies Dizziness       69 y/o male BIB [facility] to rm 23.   EMS adm nitro x1  asa x 324mg        Wt 120 kg (264 lb)   BMI 41.35 kg/m²

## 2024-05-20 NOTE — ASSESSMENT & PLAN NOTE
Troponin T 22 >> 219  Trend CE, EKG  CTA complete thoracoabdominal scan: No dissection, no PE    Heparin drip  ASA/statin  As needed nitro SL, morphine  Check echo  Consult cardiology in a.m.    Troponin went up to 347, concern for possible STEMI, stat EKG, discussed with cardiology team,discussed with bed side nurse, continue heparin drip, patient will require cath today. Continue monitoring telemetry.

## 2024-05-20 NOTE — CARE PLAN
Problem: Pain - Standard  Goal: Alleviation of pain or a reduction in pain to the patient’s comfort goal  Outcome: Progressing     Problem: Knowledge Deficit - Standard  Goal: Patient and family/care givers will demonstrate understanding of plan of care, disease process/condition, diagnostic tests and medications  Outcome: Progressing     Problem: Hemodynamics  Goal: Patient's hemodynamics, fluid balance and neurologic status will be stable or improve  Outcome: Progressing     Problem: Fluid Volume  Goal: Fluid volume balance will be maintained  Outcome: Progressing     Problem: Urinary - Renal Perfusion  Goal: Ability to achieve and maintain adequate renal perfusion and functioning will improve  Outcome: Progressing     Problem: Respiratory  Goal: Patient will achieve/maintain optimum respiratory ventilation and gas exchange  Outcome: Progressing     Problem: Mechanical Ventilation  Goal: Safe management of artificial airway and ventilation  Outcome: Progressing  Goal: Successful weaning off mechanical ventilator, spontaneously maintains adequate gas exchange  Outcome: Progressing  Goal: Patient will be able to express needs and understand communication  Outcome: Progressing     Problem: Physical Regulation  Goal: Diagnostic test results will improve  Outcome: Progressing  Goal: Signs and symptoms of infection will decrease  Outcome: Progressing     Problem: Fall Risk  Goal: Patient will remain free from falls  Outcome: Progressing   The patient is Watcher - Medium risk of patient condition declining or worsening    Shift Goals  Clinical Goals: VSS, heart cath, safety  Patient Goals: Rest  Family Goals: Updates    Progress made toward(s) clinical / shift goals:  VSS, heart cath., safety    Patient is not progressing towards the following goals:

## 2024-05-20 NOTE — PROGRESS NOTES
Received report from ED RNAntony. Patient transferred to tele with all belongings at bedside, called monitors, placed patient on tele box, all fall and safety precautions in place. Bed locked and in lowest position, call light in reach, bed alarm on.

## 2024-05-20 NOTE — CONSULTS
Reason of Consult: Consideration of interventional management    Consulting Physician: Dr. Soares    HPI:  68-year-old male with a history of obesity smoking but recently quit and nonrheumatic arthritis who does not drink or do drugs or have a family history of precocious coronary artery disease presents with sudden onset substernal chest pressure radiating to his back and arm early in the morning of 2024.  Upon arrival EKG is concerning for anterior ST elevation with reciprocal changes.  This was deemed to represent a non-STEMI by consulting services and he was initiated on upfront medical therapy.  Despite this his troponin continues to climb and he continues to have substernal burning chest pressure.  This consultation is obtained in this context.  Repeat EKG shows improved ST changes in his chest pain remains intermittent, overall improved from arrival, but still ongoing intermittently.  CTA of the chest revealed no thoracic aortic dissection.  Troponins have trended up to 3000 from initial presentation value of 22.    Past Medical History:   Diagnosis Date    Arthritis     ED (erectile dysfunction)     GERD (gastroesophageal reflux disease) 2011    Osteoarthritis of lumbar spine 2011       Social History     Socioeconomic History    Marital status:      Spouse name: Not on file    Number of children: Not on file    Years of education: Not on file    Highest education level: Not on file   Occupational History    Occupation:      Employer: [employer]   Tobacco Use    Smoking status: Former     Current packs/day: 0.00     Types: Cigarettes     Quit date: 2011     Years since quittin.9    Smokeless tobacco: Never   Vaping Use    Vaping status: Never Used   Substance and Sexual Activity    Alcohol use: Never     Comment: Rare useage    Drug use: No    Sexual activity: Never     Partners: Female   Other Topics Concern    Not on file   Social History Narrative    Not on file      Social Determinants of Health     Financial Resource Strain: Not on file   Food Insecurity: Not on file   Transportation Needs: Not on file   Physical Activity: Not on file   Stress: Not on file   Social Connections: Not on file   Intimate Partner Violence: Not At Risk (5/20/2024)    Humiliation, Afraid, Rape, and Kick questionnaire     Fear of Current or Ex-Partner: No     Emotionally Abused: No     Physically Abused: No     Sexually Abused: No   Housing Stability: Not on file       No current facility-administered medications on file prior to encounter.     Current Outpatient Medications on File Prior to Encounter   Medication Sig Dispense Refill    ibuprofen (MOTRIN) 200 MG Tab Take 200 mg by mouth 1 time a day as needed.      tamsulosin (FLOMAX) 0.4 MG capsule Take 0.4 mg by mouth every day.         Current Facility-Administered Medications   Medication Dose Frequency Provider Last Rate Last Admin    acetaminophen (Tylenol) tablet 650 mg  650 mg Q6HRS PRN Bienvenido Soria M.D.        labetalol (Normodyne/Trandate) injection 10 mg  10 mg Q4HRS PRN Bienvenido Soria M.D.        [START ON 5/21/2024] aspirin EC tablet 81 mg  81 mg DAILY Bienvenido Soria M.D.        heparin injection 2,000 Units  2,000 Units PRN Bienvenido Soria M.D.        atorvastatin (Lipitor) tablet 40 mg  40 mg Q EVENING Bienvenido Soria M.D.        nitroglycerin (Nitrostat) tablet 0.4 mg  0.4 mg Q5 MIN PRN Bienvenido Soria M.D.        morphine 4 MG/ML injection 2-4 mg  2-4 mg Q5 MIN PRN Bienvenido Soria M.D.        ondansetron (Zofran) syringe/vial injection 4 mg  4 mg Q4HRS PRN Bienvenido Soria M.D.        ondansetron (Zofran ODT) dispertab 4 mg  4 mg Q4HRS PRN Bienvenido Soria M.D.        senna-docusate (Pericolace Or Senokot S) 8.6-50 MG per tablet 2 Tablet  2 Tablet Q EVENING Bienvenido Soria M.D.        And    polyethylene glycol/lytes (Miralax) Packet 1 Packet  1 Packet QDAY PRN Bienvenido Soria M.D.        tamsulosin (Flomax) capsule 0.4 mg  0.4 mg DAILY Bienvenido  "HIREN Soria   0.4 mg at 05/20/24 0525    ipratropium-albuterol (DUONEB) nebulizer solution  3 mL Q4H PRN (RT) Bienvenido Soria M.D.        omeprazole (PriLOSEC) capsule 20 mg  20 mg BID Bienvenido Soria M.D.   20 mg at 05/20/24 0525    metoprolol tartrate (Lopressor) tablet 50 mg  50 mg BID Marisela Soares M.D.   50 mg at 05/20/24 0622   Last reviewed on 5/20/2024  3:00 AM by Ayde Ch PhT     Patient has no known allergies.    Family History   Problem Relation Age of Onset    Stroke Mother        ROS: As per HPI all other systems reviewed and negative     Physical Exam   Blood pressure 132/82, pulse 68, temperature 37 °C (98.6 °F), temperature source Temporal, resp. rate 18, height 1.676 m (5' 6\"), weight 115 kg (253 lb 8.5 oz), SpO2 93%.    Constitutional:  Appears well-developed.   HENT: Normocephalic and atraumatic. No scleral icterus.   Neck: No JVD present.   Cardiovascular: Normal rate.   Pulmonary/Chest: Normal chest rise  Abdominal: S/NT/ND BS+   Musculoskeletal:  Pulses present. No atrophy. Strength normal.  Extremities: Exhibits no edema. No clubbing or cyanosis.   Skin: Skin is warm and dry.   Neuro: Non-focal, CN 2-12 intact grossly    No intake or output data in the 24 hours ending 05/20/24 1148    Recent Labs     05/19/24 2345 05/20/24  0751   WBC 12.0* 16.0*   RBC 5.04 5.00   HEMOGLOBIN 16.5 16.0   HEMATOCRIT 45.6 44.6   MCV 90.5 89.2   MCH 32.7 32.0   MCHC 36.2 35.9   RDW 42.1 41.3   PLATELETCT 222 219   MPV 9.3 9.4     Recent Labs     05/19/24  2345 05/20/24  0751   SODIUM 141 137   POTASSIUM 4.0 4.1   CHLORIDE 103 103   CO2 23 20   GLUCOSE 191* 170*   BUN 13 12   CREATININE 0.92 0.67   CALCIUM 9.4 9.4     Recent Labs     05/20/24  0251   APTT 31.8   INR 1.21*             Recent Labs     05/20/24  0751   TRIGLYCERIDE 133   HDL 45   *       Imaging reviewed    Impressions:  1.  High risk acute coronary syndrome  2.  Stuttering symptoms despite maximal medical therapy  3.  Obesity  4.  " Tobacco abuse    Recommendations:  Recommend urgent coronary angiography with possible PCI and he will be brought down ahead of other patients awaiting procedures.  Continue medical therapy as directed by the primary cardiovascular noninvasive services.    I have discussed the risks and benefits of cardiac catheterization as well as the procedure itself, rationale and appropriateness in detail with the patient today. Complications including but not limited to death, stroke, MI, urgent bypass surgery, contrast nephropathy, vascular complications, bleeding and infection were explained to the patient. The potential outcomes associated with the procedure (possible PCI, possible CABG, possible medical Rx only) were also discussed at length. The patient agrees to proceed.]    Given the above I agree with interventional evaluation/management with coronary angiography and possible PCI.  Recommend continuing guideline directed medical therapy and care of ancillary medical conditions under the care of his primary cardiology service.    Thank you for this interesting consultation. It was my pleasure to see Ray Tesfaye today.    Andrew Eason MD, FACC, [facility]  Division of Interventional Cardiology  [facility]

## 2024-05-20 NOTE — H&P
Hospital Medicine History & Physical Note    Date of Service  5/20/2024    Primary Care Physician  Marisol Burgos M.D.    Consultants  Cardiology (Dr. Soares)    Code Status  Full Code    Chief Complaint  Chief Complaint   Patient presents with    Chest Pain     Pt report left side chest pressure 8/10 x last night. Denies SOB. Denies Cough, denies Dizziness       History of Presenting Illness  Ray Tesfaye is a 68 y.o. morbidly obese male with history of tobacco abuse (quit 01/2024), arthritis who presented 5/19/2024 with evaluation for chest pain.  History obtained with the assistance of patient's son at bedside in ER as patient is poor historian. Patient reported having sudden onset of left-sided chest discomfort while he was sitting down at the end of her dinner meal.  He also reported having heavy sensation of upper extremities.  His wife call patient's son, who subsequently called 911.  Initially brought to ER by EMS for STEMI.  However, evaluated by cardiology (Dr. Soares) in ER, STEMI alert aborted asked criteria not met.  No acute finding on thoracoabdominal scan, initial troponin T 22, increased to 219 on repeat.  Patient has been initiated on heparin drip.  Admission requested by ERP.  Therefore, admitted to medicine service for further evaluation treatment.    I discussed the plan of care with patient, bedside RN, and pharmacy.    Review of Systems  Review of Systems   Constitutional:  Positive for malaise/fatigue. Negative for chills and fever.   HENT:  Negative for hearing loss and tinnitus.    Eyes:  Negative for blurred vision and double vision.   Respiratory:  Negative for cough and shortness of breath.    Cardiovascular:  Positive for chest pain and palpitations.   Gastrointestinal:  Negative for abdominal pain, heartburn, nausea and vomiting.   Genitourinary:  Negative for dysuria and hematuria.   Musculoskeletal:  Positive for myalgias. Negative for joint pain.   Skin:  Negative for itching and  rash.   Neurological:  Positive for weakness. Negative for dizziness and headaches.   Endo/Heme/Allergies:  Negative for environmental allergies. Does not bruise/bleed easily.   Psychiatric/Behavioral:  Negative for depression and substance abuse.    All other systems reviewed and are negative.      Past Medical History   has a past medical history of Arthritis, ED (erectile dysfunction), GERD (gastroesophageal reflux disease) (12/27/2011), and Osteoarthritis of lumbar spine (12/27/2011).    Surgical History   has a past surgical history that includes knee arthroscopy (11/3/2011) and meniscectomy, knee, medial (11/3/2011).     Family History  family history includes Stroke in his mother.   Family history reviewed with patient. There is family history that is pertinent to the chief complaint.     Social History   reports that he quit smoking about 12 years ago. He has never used smokeless tobacco. He reports that he does not drink alcohol and does not use drugs.    Allergies  No Known Allergies    Medications  Prior to Admission Medications   Prescriptions Last Dose Informant Patient Reported? Taking?   ibuprofen (MOTRIN) 200 MG Tab FEW DAYS AGO at PRN Patient Yes Yes   Sig: Take 200 mg by mouth 1 time a day as needed.   tamsulosin (FLOMAX) 0.4 MG capsule FEW DAYS AGO at UNK* Patient Yes No   Sig: Take 0.4 mg by mouth every day.      Facility-Administered Medications: None       Physical Exam  Temp:  [36.7 °C (98 °F)] 36.7 °C (98 °F)  Pulse:  [62-80] 68  Resp:  [18-29] 20  BP: (115-164)/(67-85) 164/77  SpO2:  [93 %-96 %] 96 %  Blood Pressure : (!) 155/74   Temperature: 36.7 °C (98 °F)   Pulse: 69   Respiration: (!) 21   Pulse Oximetry: 96 %       Physical Exam  Vitals and nursing note reviewed.   Constitutional:       General: He is not in acute distress.     Appearance: He is obese.   HENT:      Head: Normocephalic and atraumatic.      Nose: Nose normal.      Mouth/Throat:      Mouth: Mucous membranes are moist.      " Pharynx: Oropharynx is clear.   Eyes:      General: No scleral icterus.     Extraocular Movements: Extraocular movements intact.   Cardiovascular:      Rate and Rhythm: Normal rate and regular rhythm.      Pulses: Normal pulses.      Heart sounds:      No friction rub.   Pulmonary:      Effort: No respiratory distress.      Breath sounds: Rales present. No wheezing.   Chest:      Chest wall: No tenderness.   Abdominal:      General: There is distension.      Tenderness: There is no abdominal tenderness. There is no guarding or rebound.   Musculoskeletal:         General: Normal range of motion.      Cervical back: Neck supple. No tenderness.      Right lower leg: No edema.      Left lower leg: No edema.   Skin:     General: Skin is warm and dry.   Neurological:      General: No focal deficit present.      Mental Status: He is alert and oriented to person, place, and time.   Psychiatric:         Mood and Affect: Mood normal.         Laboratory:  Recent Labs     05/19/24  2345   WBC 12.0*   RBC 5.04   HEMOGLOBIN 16.5   HEMATOCRIT 45.6   MCV 90.5   MCH 32.7   MCHC 36.2   RDW 42.1   PLATELETCT 222   MPV 9.3     Recent Labs     05/19/24  2345   SODIUM 141   POTASSIUM 4.0   CHLORIDE 103   CO2 23   GLUCOSE 191*   BUN 13   CREATININE 0.92   CALCIUM 9.4     Recent Labs     05/19/24  2345   ALTSGPT 43   ASTSGOT 44   ALKPHOSPHAT 88   TBILIRUBIN 0.4   GLUCOSE 191*     Recent Labs     05/20/24  0251   APTT 31.8   INR 1.21*     No results for input(s): \"NTPROBNP\" in the last 72 hours.      Recent Labs     05/19/24  2345 05/20/24  0204   TROPONINT 22* 219*       Imaging:  CT-CTA COMPLETE THORACOABDOMINAL AORTA   Final Result         1.  No aortic aneurysm or dissection identified.         DX-CHEST-PORTABLE (1 VIEW)   Final Result         1.  No acute cardiopulmonary disease.      EC-ECHOCARDIOGRAM COMPLETE W/O CONT    (Results Pending)   CL-LEFT HEART CATHETERIZATION WITH POSSIBLE INTERVENTION    (Results Pending) "             X-Ray:  I have personally reviewed the images and compared with prior images.  EKG:  I have personally reviewed the images and compared with prior images.    Assessment/Plan:  Justification for Admission Status  I anticipate this patient will require at least 2 midnights of hospitalization, therefore appropriate for inpatient status.    * NSTEMI (non-ST elevated myocardial infarction) ([facility])- (present on admission)  Assessment & Plan  Troponin T 22 >> 219  Trend CE, EKG  CTA complete thoracoabdominal scan: No dissection, no PE    Heparin drip  ASA/statin  As needed nitro SL, morphine  Check echo  Consult cardiology in a.m.    Unstable angina ([facility])  Assessment & Plan  Plan as above    Lumbar disc disease- (present on admission)  Assessment & Plan  Per history  Supportive care    GERD (gastroesophageal reflux disease)- (present on admission)  Assessment & Plan  Omeprazole    Former heavy tobacco smoker  Assessment & Plan  Quit smoking 01/2024    Class 3 severe obesity in adult ([facility])  Assessment & Plan  Diet and lifestyle modification  Body mass index is 41.35 kg/m².      Hyperglycemia  Assessment & Plan  Check HbA1c        VTE prophylaxis: therapeutic anticoagulation with heparin drip    Critical care time: 40 minutes spent in chart review, medical management, chronic care patient/ER staff/RN/pharmacy/consulting provider/frequent reevaluation of patient's clinical as well as treatment

## 2024-05-20 NOTE — HOSPITAL COURSE
Ray Tesfaye is a 68 y.o. morbidly obese male with history of tobacco abuse (quit 01/2024), arthritis who presented 5/19/2024 with evaluation for chest pain.  History obtained with the assistance of patient's son at bedside in ER as patient is poor historian. Patient reported having sudden onset of left-sided chest discomfort while he was sitting down at the end of her dinner meal.  He also reported having heavy sensation of upper extremities.  His wife call patient's son, who subsequently called 911.  Initially brought to ER by EMS for STEMI.  However, evaluated by cardiology (Dr. Soares) in ER, STEMI alert aborted asked criteria not met.  No acute finding on thoracoabdominal scan, initial troponin T 22, increased to 219 on repeat.  Patient has been initiated on heparin drip.  Admission requested by ERP.  Therefore, admitted to medicine service for further evaluation treatment.

## 2024-05-20 NOTE — ED NOTES
Heparin Drip initiated per Mar. Required anti coag studies labs, collected and sent to lab for analysis

## 2024-05-20 NOTE — CONSULTS
Cardiology Consult Note:    Marisela Soares M.D.  Date & Time note created:    5/20/2024   5:45 AM     Referring MD:  Dr. Soria    Patient ID:   Name:             Ray Tesfaye   YOB: 1955  Age:                 68 y.o.  male   MRN:               [id number]                                                             Chief Complaint / Reason for consult:  Chest pain.    History of Present Illness:    This is a 68 years old man with no prior cardiac problems, presented to the hospital ER with chest pain.  Onset started 8 PM last night.  On and off.  Eventually got worse and he went into the hospital.  Initially chest pain was 8 out of 10.  At this time patient does report of having improvement in his chest pressure sensation now rated to be 1 out of 10 very slight.    I personally interpreted the EKG tracing which did not show evidence meeting criteria of ST elevation.  However, EKG does show evidence of coronary ischemia.    No family history of sudden cardiac death.    Patient did CT angiogram did not show evidence of aortic dissection.    Review of Systems:      Constitutional: Denies fevers, Denies weight changes  Eyes: Denies changes in vision, no eye pain  Ears/Nose/Throat/Mouth: Denies nasal congestion or sore throat   Cardiovascular: yes chest pain, no palpitations   Respiratory: no shortness of breath , Denies cough  Gastrointestinal/Hepatic: Denies abdominal pain, nausea, vomiting, diarrhea, constipation or GI bleeding   Genitourinary: Denies dysuria or frequency  Musculoskeletal/Rheum: Denies  joint pain and swelling   Skin: Denies rash  Neurological: Denies headache, confusion, memory loss or focal weakness/parasthesias  Psychiatric: denies mood disorder   Endocrine: Gladys thyroid problems  Heme/Oncology/Lymph Nodes: Denies enlarged lymph nodes, denies brusing or known bleeding disorder  All other systems were reviewed and are negative (AMA/CMS criteria)                Past Medical  History:   Past Medical History:   Diagnosis Date    Arthritis     ED (erectile dysfunction)     GERD (gastroesophageal reflux disease) 12/27/2011    Osteoarthritis of lumbar spine 12/27/2011     Active Hospital Problems    Diagnosis     NSTEMI (non-ST elevated myocardial infarction) ([facility]) [I21.4]     Hyperglycemia [R73.9]     Unstable angina ([facility]) [I20.0]     Class 3 severe obesity in adult ([facility]) [E66.01]     Former heavy tobacco smoker [Z87.891]     Lumbar disc disease [M51.9]     GERD (gastroesophageal reflux disease) [K21.9]        Past Surgical History:  Past Surgical History:   Procedure Laterality Date    KNEE ARTHROSCOPY  11/3/2011    Performed by PRIMO ROSA at [facility]    MENISCECTOMY, KNEE, MEDIAL  11/3/2011    Performed by PRIMO ROSA at [facility] Medications:    Current Facility-Administered Medications:     acetaminophen (Tylenol) tablet 650 mg, 650 mg, Oral, Q6HRS PRN, Bienvenido Soria M.D.    labetalol (Normodyne/Trandate) injection 10 mg, 10 mg, Intravenous, Q4HRS PRN, Bienvenido Soria M.D.    [START ON 5/21/2024] aspirin EC tablet 81 mg, 81 mg, Oral, DAILY, Bienvenido Soria M.D.    heparin infusion 25,000 units in 500 mL 0.45% NACL, 0-30 Units/kg/hr (Adjusted), Intravenous, Continuous, Bienvenido Soria M.D., Last Rate: 21 mL/hr at 05/20/24 0355, 12 Units/kg/hr at 05/20/24 0355    heparin injection 2,000 Units, 2,000 Units, Intravenous, PRN, Bienvenido Soria M.D.    atorvastatin (Lipitor) tablet 40 mg, 40 mg, Oral, Q EVENING, Bienvenido Soria M.D.    nitroglycerin (Nitrostat) tablet 0.4 mg, 0.4 mg, Sublingual, Q5 MIN PRN, Bienvenido Soria M.D.    morphine 4 MG/ML injection 2-4 mg, 2-4 mg, Intravenous, Q5 MIN PRN, Bienvenido Soria M.D.    ondansetron (Zofran) syringe/vial injection 4 mg, 4 mg, Intravenous, Q4HRS PRN, Bienvenido Soria M.D.    ondansetron (Zofran ODT) dispertab 4 mg, 4 mg, Oral, Q4HRS PRN, Bienvenido Soria M.D.    senna-docusate (Pericolace Or Senokot S)  8.6-50 MG per tablet 2 Tablet, 2 Tablet, Oral, Q EVENING **AND** polyethylene glycol/lytes (Miralax) Packet 1 Packet, 1 Packet, Oral, QDAY PRN, Bienvenido Soria M.D.    tamsulosin (Flomax) capsule 0.4 mg, 0.4 mg, Oral, DAILY, Bienvenido Soria M.D., 0.4 mg at 24 0525    ipratropium-albuterol (DUONEB) nebulizer solution, 3 mL, Nebulization, Q4H PRN (RT), Bienvenido Soria M.D.    omeprazole (PriLOSEC) capsule 20 mg, 20 mg, Oral, BID, Bienvenido Soria M.D., 20 mg at 24 0525    metoprolol tartrate (Lopressor) tablet 25 mg, 25 mg, Oral, BID, Marisela Soares M.D., 25 mg at 24 0437    Current Outpatient Medications:  Medications Prior to Admission   Medication Sig Dispense Refill Last Dose    ibuprofen (MOTRIN) 200 MG Tab Take 200 mg by mouth 1 time a day as needed.   FEW DAYS AGO at PRN    tamsulosin (FLOMAX) 0.4 MG capsule Take 0.4 mg by mouth every day.   FEW DAYS AGO at UNK*       Medication Allergy:  No Known Allergies    Family History:  Family History   Problem Relation Age of Onset    Stroke Mother        Social History:  Social History     Socioeconomic History    Marital status:      Spouse name: Not on file    Number of children: Not on file    Years of education: Not on file    Highest education level: Not on file   Occupational History    Occupation: [employer]     Employer: [employer]   Tobacco Use    Smoking status: Former     Current packs/day: 0.00     Types: Cigarettes     Quit date: 2011     Years since quittin.9    Smokeless tobacco: Never   Vaping Use    Vaping status: Never Used   Substance and Sexual Activity    Alcohol use: Never     Comment: Rare useage    Drug use: No    Sexual activity: Never     Partners: Female   Other Topics Concern    Not on file   Social History Narrative    Not on file     Social Determinants of Health     Financial Resource Strain: Not on file   Food Insecurity: Not on file   Transportation Needs: Not on file   Physical Activity: Not on file  "  Stress: Not on file   Social Connections: Not on file   Intimate Partner Violence: Not At Risk (5/20/2024)    Humiliation, Afraid, Rape, and Kick questionnaire     Fear of Current or Ex-Partner: No     Emotionally Abused: No     Physically Abused: No     Sexually Abused: No   Housing Stability: Not on file         Physical Exam:  Vitals/ General Appearance:   Weight/BMI: Body mass index is 40.92 kg/m².  BP (!) 153/84   Pulse 71   Temp 36.6 °C (97.9 °F) (Temporal)   Resp 18   Ht 1.676 m (5' 6\")   Wt 115 kg (253 lb 8.5 oz)   SpO2 94%   Vitals:    05/20/24 0300 05/20/24 0410 05/20/24 0448 05/20/24 0452   BP: (!) 164/77 (!) 153/84     Pulse: 68 70 71    Resp: 20 20 18    Temp:  36.6 °C (97.9 °F) 36.6 °C (97.9 °F)    TempSrc:  Temporal Temporal    SpO2: 96%  94%    Weight:  115 kg (254 lb 10.1 oz) 115 kg (253 lb 8.5 oz) 115 kg (253 lb 8.5 oz)   Height:  1.702 m (5' 7\") 1.676 m (5' 6\")      Oxygen Therapy:  Pulse Oximetry: 94 %, O2 (LPM): 0, O2 Delivery Device: None - Room Air    Constitutional:   No acute distress  HENMT:  Normocephalic, Atraumatic.  Eyes:  EOMI, No discharge.  Neck:  no JVD.  Cardiovascular:  Normal heart rate, Normal rhythm.  Extremitites with intact distal pulses, no cyanosis, or edema.  Lungs:  No respiratory distress.  Abdomen: Soft, No tenderness, No guarding, No rebound.  Skin: No significant rash.  Neurologic: Alert & oriented x 3, No focal deficits noted, cranial nerves II through X are intact.  Psychiatric: Affect normal, Judgment normal, Mood normal.      MDM (Data Review):     Records reviewed and summarized in current documentation    Lab Data Review:  Recent Results (from the past 24 hour(s))   EKG    Collection Time: 05/19/24 11:42 PM   Result Value Ref Range    Report       [facility]    Test Date:  2024-05-19  Pt Name:    GHULAM ALVARADO                 Department:   MRN:        [id number]                      Room:       [facility]  Gender:     Male    "                      Technician: [id number]  :        1955                   Requested By:ER TRIAGE PROTOCOL  Order #:    [id number]                    Reading MD:    Measurements  Intervals                                Axis  Rate:       69                           P:          65  FL:         158                          QRS:        60  QRSD:       89                           T:          16  QT:         389  QTc:        417    Interpretive Statements  Sinus rhythm  Minimal ST depression, lateral leads  ST elevation, consider anterior injury  No previous ECG available for comparison     CBC with Differential    Collection Time: 24 11:45 PM   Result Value Ref Range    WBC 12.0 (H) 4.8 - 10.8 K/uL    RBC 5.04 4.70 - 6.10 M/uL    Hemoglobin 16.5 14.0 - 18.0 g/dL    Hematocrit 45.6 42.0 - 52.0 %    MCV 90.5 81.4 - 97.8 fL    MCH 32.7 27.0 - 33.0 pg    MCHC 36.2 32.3 - 36.5 g/dL    RDW 42.1 35.9 - 50.0 fL    Platelet Count 222 164 - 446 K/uL    MPV 9.3 9.0 - 12.9 fL    Neutrophils-Polys 61.00 44.00 - 72.00 %    Lymphocytes 29.50 22.00 - 41.00 %    Monocytes 7.60 0.00 - 13.40 %    Eosinophils 1.00 0.00 - 6.90 %    Basophils 0.60 0.00 - 1.80 %    Immature Granulocytes 0.30 0.00 - 0.90 %    Nucleated RBC 0.00 0.00 - 0.20 /100 WBC    Neutrophils (Absolute) 7.34 1.82 - 7.42 K/uL    Lymphs (Absolute) 3.54 1.00 - 4.80 K/uL    Monos (Absolute) 0.91 (H) 0.00 - 0.85 K/uL    Eos (Absolute) 0.12 0.00 - 0.51 K/uL    Baso (Absolute) 0.07 0.00 - 0.12 K/uL    Immature Granulocytes (abs) 0.04 0.00 - 0.11 K/uL    NRBC (Absolute) 0.00 K/uL   Complete Metabolic Panel (CMP)    Collection Time: 24 11:45 PM   Result Value Ref Range    Sodium 141 135 - 145 mmol/L    Potassium 4.0 3.6 - 5.5 mmol/L    Chloride 103 96 - 112 mmol/L    Co2 23 20 - 33 mmol/L    Anion Gap 15.0 7.0 - 16.0    Glucose 191 (H) 65 - 99 mg/dL    Bun 13 8 - 22 mg/dL    Creatinine 0.92 0.50 - 1.40 mg/dL    Calcium 9.4 8.5 - 10.5 mg/dL    Correct Calcium 9.2  8.5 - 10.5 mg/dL    AST(SGOT) 44 12 - 45 U/L    ALT(SGPT) 43 2 - 50 U/L    Alkaline Phosphatase 88 30 - 99 U/L    Total Bilirubin 0.4 0.1 - 1.5 mg/dL    Albumin 4.3 3.2 - 4.9 g/dL    Total Protein 7.1 6.0 - 8.2 g/dL    Globulin 2.8 1.9 - 3.5 g/dL    A-G Ratio 1.5 g/dL   Troponins NOW    Collection Time: 05/19/24 11:45 PM   Result Value Ref Range    Troponin T 22 (H) 6 - 19 ng/L   ESTIMATED GFR    Collection Time: 05/19/24 11:45 PM   Result Value Ref Range    GFR (CKD-EPI) 91 >60 mL/min/1.73 m 2   Troponins in two (2) hours    Collection Time: 05/20/24  2:04 AM   Result Value Ref Range    Troponin T 219 (H) 6 - 19 ng/L   aPTT    Collection Time: 05/20/24  2:51 AM   Result Value Ref Range    APTT 31.8 24.7 - 36.0 sec   Prothrombin Time    Collection Time: 05/20/24  2:51 AM   Result Value Ref Range    PT 15.4 (H) 12.0 - 14.6 sec    INR 1.21 (H) 0.87 - 1.13   Heparin Xa (Unfractionated)    Collection Time: 05/20/24  2:51 AM   Result Value Ref Range    Heparin Xa (UFH) <0.10 IU/mL       Imaging/Procedures Review:    Chest Xray:  Reviewed    EKG:   As in HPI.     MDM (Assessment and Plan):     Active Hospital Problems    Diagnosis     NSTEMI (non-ST elevated myocardial infarction) (HCC) [I21.4]     Hyperglycemia [R73.9]     Unstable angina (HCC) [I20.0]     Class 3 severe obesity in adult ([facility]) [E66.01]     Former heavy tobacco smoker [Z87.891]     Lumbar disc disease [M51.9]     GERD (gastroesophageal reflux disease) [K21.9]          Overall, there is strong suspicion for acute coronary syndrome as patient does have high risk for obstructive coronary artery disease with prior risk factors and clinical presentation is concerned for acute coronary syndrome/unstable angina.  At this time, I recommend further invasive cardiac work-up with a cardiac catheterization/coronary angiogram to further delineate coronary anatomy to facilitate appropriate treatment for lifesaving measures.  Risks and benefits of the procedure were  explained to patient. Risks include infection, bleeding, stroke, death, etc.  At this time, patient has agreed to proceed to undergo this invasive cardiac procedure.    Will continue heparin gtt with APTT goal from 60-80.  Nitro gtt with titration to chest pain free if persistent chest pain.  Optimize medical therapy as well with Metoprolol 25 mg po bid, ASA, Lipitor 80 mg po daily  I also recommend a transthoracic echocardiogram to further assess cardiac function and valvular function.  Consider taking patient to cath lab urgently if chest pain is not relieved with medicines or patient becomes hemodynamically unstable.  Otherwise, will perform coronary angiogram ASAP via our cath lab protocol.      Thank you for referring this patient to our cardiology service.  We will follow patient with you.      Marisela Soares MD.   Cardiology Inpatient Service.  [facility].  [phone].  Marcia Brar.

## 2024-05-20 NOTE — DIETARY
Nutrition Services: Cardiac Education Consult   Day 0 of admit.  Ray Tesfaye is a 68 y.o. male with admitting DX of NSTEMI (non-ST elevated myocardial infarction)    RD able to visit pt at bedside to provide heart healthy diet education. RD provided handout reinforcing topics per pt request. Pt and family politely declined explanation. RD able to answer all questions to patient's satisfaction.     Pt with BMI >40 (=Body mass index is 40.92 kg/m².), Class III obesity. Weight loss counseling not appropriate in acute care setting.     No other education needs identified at this time. Consider referral to outpatient nutrition services for continuation of education and weight management as indicated or per pt preferences.     Please re-consult RD as indicated.

## 2024-05-20 NOTE — ED PROVIDER NOTES
CHIEF COMPLAINT  Chief Complaint   Patient presents with    Chest Pain     Pt report left side chest pressure 8/10 x last night. Denies SOB. Denies Cough, denies Dizziness       LIMITATION TO HISTORY   Select:     MIGUEL Tesfaye is a 68 y.o. male who presents to the Emergency Department for chest pressure at times starting last night while asleep denies shortness of breath reports feeling heaviness in his arms pain was a 9 out of 10 now it is a 6 out of 10 after receiving nitroglycerin and aspirin by EMS    OUTSIDE HISTORIAN(S):  Select:    EXTERNAL RECORDS REVIEWED  Select:       PAST MEDICAL HISTORY  Past Medical History:   Diagnosis Date    Arthritis     ED (erectile dysfunction)     GERD (gastroesophageal reflux disease) 2011    Osteoarthritis of lumbar spine 2011     .    SURGICAL HISTORY  Past Surgical History:   Procedure Laterality Date    KNEE ARTHROSCOPY  11/3/2011    Performed by PRIMO ROSA at [facility]    MENISCECTOMY, KNEE, MEDIAL  11/3/2011    Performed by PRIMO ROSA at [facility]         FAMILY HISTORY  Family History   Problem Relation Age of Onset    Stroke Mother           SOCIAL HISTORY  Social History     Socioeconomic History    Marital status:      Spouse name: Not on file    Number of children: Not on file    Years of education: Not on file    Highest education level: Not on file   Occupational History    Occupation:      Employer: [email or web address]   Tobacco Use    Smoking status: Former     Current packs/day: 0.00     Types: Cigarettes     Quit date: 2011     Years since quittin.9    Smokeless tobacco: Never   Vaping Use    Vaping status: Never Used   Substance and Sexual Activity    Alcohol use: Never     Comment: Rare useage    Drug use: No    Sexual activity: Never     Partners: Female   Other Topics Concern    Not on file   Social History Narrative    Not on file     Social Determinants of Health     Financial  "Resource Strain: Not on file   Food Insecurity: Not on file   Transportation Needs: Not on file   Physical Activity: Not on file   Stress: Not on file   Social Connections: Not on file   Intimate Partner Violence: Not on file   Housing Stability: Not on file         CURRENT MEDICATIONS  No current facility-administered medications on file prior to encounter.     Current Outpatient Medications on File Prior to Encounter   Medication Sig Dispense Refill    ibuprofen (MOTRIN) 200 MG Tab Take 200 mg by mouth 1 time a day as needed.      tamsulosin (FLOMAX) 0.4 MG capsule Take 0.4 mg by mouth every day.             ALLERGIES  No Known Allergies    PHYSICAL EXAM  VITAL SIGNS:BP (!) 153/84   Pulse 70   Temp 36.6 °C (97.9 °F) (Temporal)   Resp 20   Ht 1.702 m (5' 7\")   Wt 115 kg (254 lb 10.1 oz)   SpO2 96%   BMI 39.88 kg/m²       GENERAL: Awake and alert  HEAD: Normocephalic and atraumatic  NECK: Normal range of motion, without meningismus  EYES: Pupils Equal, Round, Reactive to Light, extraocular movements intact, conjunctiva white  ENT: Mucous membranes moist, oropharynx clear  PULMONARY: Normal effort, clear to auscultation  CARDIOVASCULAR: No murmurs, clicks or rubs, peripheral pulses 2+  ABDOMINAL: Soft, non-tender, no guarding or rigidity present, no pulsatile masses  BACK: no midline tenderness, no costovertebral tenderness  NEUROLOGICAL: Grossly non-focal neurological examination, speech normal, gait normal  EXTREMITIES: No edema, normal to inspection  SKIN: Warm and dry.  PSYCHIATRIC: Affect is appropriate    DIAGNOSTIC STUDIES / PROCEDURES  EKG    CRITICAL CARE  The very real possibilty of a deterioration of this patient's condition required the highest level of my preparedness for sudden, emergent intervention.  I provided critical care services, which included medication orders, frequent reevaluations of the patient's condition and response to treatment, ordering and reviewing test results, and discussing " the case with various consultants.  The critical care time associated with the care of the patient was 40 minutes. Review chart for interventions. This time is exclusive of any other billable procedures.    EKG Interpretation: I independently reviewed the below EKG and did not see signs of a STEMI  Results for orders placed or performed during the hospital encounter of 24   EKG   Result Value Ref Range    Report       [facility] Emergency Dept.    Test Date:  2024  Pt Name:    GHULAM ALVARADO                 Department: ER  MRN:        [id number]                      Room:       [facility]  Gender:     Male                         Technician: [id number]  :        1955                   Requested By:ER TRIAGE PROTOCOL  Order #:    [id number]                    Reading MD:    Measurements  Intervals                                Axis  Rate:       69                           P:          65  RI:         158                          QRS:        60  QRSD:       89                           T:          16  QT:         389  QTc:        417    Interpretive Statements  Sinus rhythm  Minimal ST depression, lateral leads  ST elevation, consider anterior injury  No previous ECG available for comparison             LABS  Labs Reviewed   CBC WITH DIFFERENTIAL - Abnormal; Notable for the following components:       Result Value    WBC 12.0 (*)     Monos (Absolute) 0.91 (*)     All other components within normal limits   COMP METABOLIC PANEL - Abnormal; Notable for the following components:    Glucose 191 (*)     All other components within normal limits   TROPONIN - Abnormal; Notable for the following components:    Troponin T 22 (*)     All other components within normal limits   TROPONIN - Abnormal; Notable for the following components:    Troponin T 219 (*)     All other components within normal limits   PROTHROMBIN TIME - Abnormal; Notable for the following components:    PT 15.4 (*)     INR 1.21 (*)      All other components within normal limits   ESTIMATED GFR   APTT   HEPARIN XA (UNFRACTIONATED)   URINE DRUG SCREEN   URINALYSIS   CBC WITH DIFFERENTIAL   DIAGNOSTIC ALCOHOL   HEMOGLOBIN A1C   LIPID PROFILE   MAGNESIUM   PROBRAIN NATRIURETIC PEPTIDE, NT   RENAL FUNCTION PANEL   TROPONIN         RADIOLOGY  I have independently interpreted the diagnostic imaging associated with this visit and am waiting the final reading from the radiologist.   My preliminary interpretation is as follows: No aortic dissection    COURSE & MEDICAL DECISION MAKING    ED COURSE:        INTERVENTIONS BY ME:  Medications   acetaminophen (Tylenol) tablet 650 mg (has no administration in time range)   labetalol (Normodyne/Trandate) injection 10 mg (has no administration in time range)   aspirin EC tablet 81 mg (has no administration in time range)   heparin infusion 25,000 units in 500 mL 0.45% NACL (12 Units/kg/hr × 87.7 kg (Adjusted) Intravenous New Bag.(Insulin or Heparin) 5/20/24 6595)   heparin injection 2,000 Units (has no administration in time range)   atorvastatin (Lipitor) tablet 40 mg (has no administration in time range)   nitroglycerin (Nitrostat) tablet 0.4 mg (has no administration in time range)   morphine 4 MG/ML injection 2-4 mg (has no administration in time range)   ondansetron (Zofran) syringe/vial injection 4 mg (has no administration in time range)   ondansetron (Zofran ODT) dispertab 4 mg (has no administration in time range)   senna-docusate (Pericolace Or Senokot S) 8.6-50 MG per tablet 2 Tablet (has no administration in time range)     And   polyethylene glycol/lytes (Miralax) Packet 1 Packet (has no administration in time range)   tamsulosin (Flomax) capsule 0.4 mg (has no administration in time range)   ipratropium-albuterol (DUONEB) nebulizer solution (has no administration in time range)   omeprazole (PriLOSEC) capsule 20 mg (has no administration in time range)   metoprolol tartrate (Lopressor) tablet 25 mg  (has no administration in time range)   nitroglycerin (Nitrostat) tablet 0.4 mg (0.4 mg Sublingual Given 5/20/24 0034)   iohexol (OMNIPAQUE) 350 mg/mL (IV) (194 mL Intravenous Given 5/20/24 0130)       Response on recheck:    11:55 PM activated code STEMI patient loaded with heparin, spoke to STEMI cardiologist Dr. Soares reviewed EKG and recommended canceling STEMI I agreed with his recommendation EKG not meeting STEMI criteria, cardiac pads applied to the patient  12:30 AM patient resting comfortably hemodynamically stable reports his pain is improved approximately 4 out of 10 patient somewhat hypertensive otherwise reassuring vital signs  2:30 AM patient resting comfortably slightly hypertensive no distress, will start heparin drip    INITIAL ASSESSMENT, COURSE AND PLAN  Care Narrative:   Patient presenting with concerning degree of chest pain EKG with subtle ST elevations not meeting STEMI criteria on repeat EKG without STEMI patient loaded with heparin repeat troponin is consistent with NSTEMI will be continued on heparin drip.  Given patient's chest heaviness high blood pressure cardiologist recommended CTA without evidence of dissection or pulmonary embolism patient provided aspirin and nitroglycerin prior to arrival to the emergency department provided heparin and admitted to the hospitalist in guarded condition         ADDITIONAL PROBLEM LIST    DISPOSITION AND DISCUSSIONS  Discussion of management with other Hospitals in [place] or appropriate source(s): None     I have discussed management of the patient with the following physicians and TONJA's: [id number] spoke to Dr. Mclean recommended canceling STEMI 1:30 AM discussed case with Dr. Mclean again    I disccused the mangament and decision to admit this patient with the Hospitalist. Dr. Soria    FINAL DIAGNOSIS  1. NSTEMI (non-ST elevated myocardial infarction) ([facility])             Electronically signed by: Darin Ching DO ,4:37 AM 05/20/24

## 2024-05-20 NOTE — DISCHARGE PLANNING
Medical Social Work    Referral: STEMI    Intervention: Pt is a 68 year old male in G23 brought in for chest pain.  Pt is Ray Tesfaye (: 1955).  Pt has family at bedside and per chart his emergency contact is his wife, Aarti ([phone]).  STEMI cancelled at this time.    Plan: SW will follow as needed.

## 2024-05-20 NOTE — PROCEDURES
Cardiac Catheterization report    5/20/2024  11:53 AM    REFERRING MD: Dr. Soares    INDICATION/PREOPERATIVE DIAGNOSIS:  High risk acute coronary syndrome  Ongoing symptoms despite medical therapy  Urgent/emergent coronary angiography    POSTOPERATIVE DIAGNOSIS:  99% subtotal thrombotic acutely occluded proximal LAD single-vessel disease  LVEF 45%, LVEDP 10 mmHg  Successful IVUS guided PCI proximal to mid LAD (4.0 x 30 mm Jerod ROYCE postdilated to 4.75 mm proximally), excellent result    RECOMMENDATIONS:  Guideline directed medical therapy   Cardiovascular Risk factor modification  Dual antiplatelet therapy for minimum 1 year      PROCEDURES PERFORMED:  Coronary arteriograms  Left heart catheterization and Left ventriculogram   Supervision moderate sedation  Percutaneous coronary intervention  Intravascular ultrasound LAD      FINDINGS:  I.  HEMODYNAMICS   Ao: 105/77 mmHg   LEDP: 10 mmHg   Gradient on LV pullback: No    II. CORONARY ANGIOGRAPHY:  Left main coronary artery: Large bifurcating no CAD  Left anterior descending artery: Large-caliber with an acute thrombotic proximal to mid 99% subtotal stenosis with FRANCIS I flow involving a diagonal and a Parson 1, 1, 0 configuration.  Post-PCI there is 0% residual stenosis and no sidebranch compromise, FRANCIS-3 flow.  Left circumflex coronary artery: Moderate caliber no CAD  Right coronary artery: Large-caliber dominant no CAD    III.  LEFT VENTRICULOGRAM:  LVEF BUSH PROJECTION: 45%      Procedure details:  The risks and benefits of cardiac catheterization and possible intervention were explained to the patient including death, heart attack, stroke, and emergency surgery.  The patient was brought to the cardiac catheterization lab where the right wrist was prepped and draped in the usual manner for cardiac catheterization.  The area was anesthetized with lidocaine and a 5/6 FR sheath was inserted into the right radial artery without difficulty. A Angel catheter was advanced to  "the ostia of the Left coronary artery and arteriograms were recorded.   A Angel catheter was advanced to the ostia of the right coronary artery and arteriograms were recorded. Aortic valve was crossed using Angel catheter for left heart catheterization was performed.     Description of PCI:  The decision was made to intervene on the culprit coronary artery.  Anticoagulation was initiated as below.  The diagnostic catheter was exchanged over a wire for an 6 Lao EBU 3.5 guide seated appropriately. A 0.014\" mm  Runthrough was advanced into the artery and use to cross the lesion. A 3.0 followed by 3.5 mm balloon was used to predilate the lesion.  A second run-through wire was used to protect the sidebranch.  IVUS was then advanced distal lesion manual pullback recorded in the LAD.  A 4.0 x 30 mm Chelsea ROYCE was then positioned and deployed at nominal pressure. Following this a 4.0 mm noncompliant followed by a more proximal 4.5 mm noncompliant balloon to high pressure was used to post dilate the stent.  Sidebranch wire was removed after confirming good flow into the sidebranch.  IVUS was repeated of the LAD showing excellent stent expansion and apposition throughout.  There was an excellent angiographic result with FRANCIS-3 flow and no residual stenosis in the stented segment. All catheters and guidewires were removed.    At the completion of the case the sheath was removed and hemostasis achieved utilizing a radial compression band patient left Cath Lab in stable condition and there were no apparent complications.    Anticoagulant: Angiomax  Antiplatelet: Effient (prasugrel)  EBL <25 cc  Complications: none  Specimens: none  Contrast: 130cc    Complications:  None apparent    Sedation time:  Moderate sedation directly monitored by me during the case while supervising the administration of the sedation medication by an independent trained RN to assist in the monitoring of the patient's level of consciousness and " physiological status. I, the supervising physician was present the entire time from beginning of medication administration until the end of the procedure from 11:01 AM until 11:43 AM. For detailed administration records please see the moderate sedation documentation in the media tab.    Following the procedure I discussed the results with the patient and attempted to contact the patients designated contact/family member at the number listed in the chart who did not answer the phone and there was no identifying information on the voicemail box therefore no message could be left.    Andrew Eason MD, FACC, [facility]

## 2024-05-21 LAB
ANION GAP SERPL CALC-SCNC: 13 MMOL/L (ref 7–16)
BUN SERPL-MCNC: 9 MG/DL (ref 8–22)
CALCIUM SERPL-MCNC: 8.9 MG/DL (ref 8.5–10.5)
CHLORIDE SERPL-SCNC: 103 MMOL/L (ref 96–112)
CO2 SERPL-SCNC: 22 MMOL/L (ref 20–33)
CREAT SERPL-MCNC: 0.67 MG/DL (ref 0.5–1.4)
ERYTHROCYTE [DISTWIDTH] IN BLOOD BY AUTOMATED COUNT: 41.9 FL (ref 35.9–50)
GFR SERPLBLD CREATININE-BSD FMLA CKD-EPI: 103 ML/MIN/1.73 M 2
GLUCOSE SERPL-MCNC: 153 MG/DL (ref 65–99)
HCT VFR BLD AUTO: 44.9 % (ref 42–52)
HGB BLD-MCNC: 16.1 G/DL (ref 14–18)
MAGNESIUM SERPL-MCNC: 1.9 MG/DL (ref 1.5–2.5)
MCH RBC QN AUTO: 32.1 PG (ref 27–33)
MCHC RBC AUTO-ENTMCNC: 35.9 G/DL (ref 32.3–36.5)
MCV RBC AUTO: 89.6 FL (ref 81.4–97.8)
PHOSPHATE SERPL-MCNC: 3.2 MG/DL (ref 2.5–4.5)
PLATELET # BLD AUTO: 220 K/UL (ref 164–446)
PMV BLD AUTO: 9.5 FL (ref 9–12.9)
POTASSIUM SERPL-SCNC: 4.1 MMOL/L (ref 3.6–5.5)
RBC # BLD AUTO: 5.01 M/UL (ref 4.7–6.1)
SODIUM SERPL-SCNC: 138 MMOL/L (ref 135–145)
WBC # BLD AUTO: 15.5 K/UL (ref 4.8–10.8)

## 2024-05-21 PROCEDURE — A9270 NON-COVERED ITEM OR SERVICE: HCPCS | Performed by: INTERNAL MEDICINE

## 2024-05-21 PROCEDURE — 700102 HCHG RX REV CODE 250 W/ 637 OVERRIDE(OP): Performed by: INTERNAL MEDICINE

## 2024-05-21 PROCEDURE — 80048 BASIC METABOLIC PNL TOTAL CA: CPT

## 2024-05-21 PROCEDURE — 85027 COMPLETE CBC AUTOMATED: CPT

## 2024-05-21 PROCEDURE — 770006 HCHG ROOM/CARE - MED/SURG/GYN SEMI*

## 2024-05-21 PROCEDURE — 97162 PT EVAL MOD COMPLEX 30 MIN: CPT

## 2024-05-21 PROCEDURE — A9270 NON-COVERED ITEM OR SERVICE: HCPCS | Performed by: STUDENT IN AN ORGANIZED HEALTH CARE EDUCATION/TRAINING PROGRAM

## 2024-05-21 PROCEDURE — 83735 ASSAY OF MAGNESIUM: CPT

## 2024-05-21 PROCEDURE — 97535 SELF CARE MNGMENT TRAINING: CPT

## 2024-05-21 PROCEDURE — 99232 SBSQ HOSP IP/OBS MODERATE 35: CPT | Performed by: STUDENT IN AN ORGANIZED HEALTH CARE EDUCATION/TRAINING PROGRAM

## 2024-05-21 PROCEDURE — A9270 NON-COVERED ITEM OR SERVICE: HCPCS

## 2024-05-21 PROCEDURE — 700102 HCHG RX REV CODE 250 W/ 637 OVERRIDE(OP): Performed by: STUDENT IN AN ORGANIZED HEALTH CARE EDUCATION/TRAINING PROGRAM

## 2024-05-21 PROCEDURE — 700102 HCHG RX REV CODE 250 W/ 637 OVERRIDE(OP)

## 2024-05-21 PROCEDURE — 700111 HCHG RX REV CODE 636 W/ 250 OVERRIDE (IP): Mod: JZ | Performed by: STUDENT IN AN ORGANIZED HEALTH CARE EDUCATION/TRAINING PROGRAM

## 2024-05-21 PROCEDURE — 99232 SBSQ HOSP IP/OBS MODERATE 35: CPT | Mod: FS | Performed by: INTERNAL MEDICINE

## 2024-05-21 PROCEDURE — 84100 ASSAY OF PHOSPHORUS: CPT

## 2024-05-21 RX ORDER — DAPAGLIFLOZIN 10 MG/1
10 TABLET, FILM COATED ORAL DAILY
Qty: 30 TABLET | Refills: 0 | Status: SHIPPED | OUTPATIENT
Start: 2024-05-21 | End: 2024-05-22

## 2024-05-21 RX ORDER — METOPROLOL SUCCINATE 25 MG/1
50 TABLET, EXTENDED RELEASE ORAL
Status: DISCONTINUED | OUTPATIENT
Start: 2024-05-22 | End: 2024-05-22 | Stop reason: HOSPADM

## 2024-05-21 RX ORDER — ASPIRIN 81 MG/1
81 TABLET ORAL DAILY
Qty: 100 TABLET | Refills: 0 | Status: SHIPPED | OUTPATIENT
Start: 2024-05-22 | End: 2024-05-22

## 2024-05-21 RX ORDER — METOPROLOL SUCCINATE 50 MG/1
50 TABLET, EXTENDED RELEASE ORAL DAILY
Qty: 30 TABLET | Refills: 0 | Status: SHIPPED | OUTPATIENT
Start: 2024-05-22 | End: 2024-05-22

## 2024-05-21 RX ORDER — ATORVASTATIN CALCIUM 40 MG/1
80 TABLET, FILM COATED ORAL EVERY EVENING
Status: DISCONTINUED | OUTPATIENT
Start: 2024-05-21 | End: 2024-05-22 | Stop reason: HOSPADM

## 2024-05-21 RX ORDER — METOPROLOL SUCCINATE 25 MG/1
25 TABLET, EXTENDED RELEASE ORAL
Status: DISCONTINUED | OUTPATIENT
Start: 2024-05-22 | End: 2024-05-21

## 2024-05-21 RX ORDER — PRASUGREL 10 MG/1
10 TABLET, FILM COATED ORAL DAILY
Qty: 30 TABLET | Refills: 0 | Status: SHIPPED | OUTPATIENT
Start: 2024-05-22 | End: 2024-05-22

## 2024-05-21 RX ORDER — DAPAGLIFLOZIN 10 MG/1
10 TABLET, FILM COATED ORAL DAILY
Status: DISCONTINUED | OUTPATIENT
Start: 2024-05-21 | End: 2024-05-22 | Stop reason: HOSPADM

## 2024-05-21 RX ORDER — ENOXAPARIN SODIUM 100 MG/ML
40 INJECTION SUBCUTANEOUS DAILY
Status: DISCONTINUED | OUTPATIENT
Start: 2024-05-21 | End: 2024-05-22 | Stop reason: HOSPADM

## 2024-05-21 RX ORDER — LOSARTAN POTASSIUM 50 MG/1
50 TABLET ORAL EVERY EVENING
Qty: 30 TABLET | Refills: 0 | Status: SHIPPED | OUTPATIENT
Start: 2024-05-21 | End: 2024-05-22

## 2024-05-21 RX ORDER — ATORVASTATIN CALCIUM 80 MG/1
80 TABLET, FILM COATED ORAL EVERY EVENING
Qty: 30 TABLET | Refills: 0 | Status: SHIPPED | OUTPATIENT
Start: 2024-05-21 | End: 2024-05-22

## 2024-05-21 RX ORDER — LOSARTAN POTASSIUM 50 MG/1
50 TABLET ORAL EVERY EVENING
Status: DISCONTINUED | OUTPATIENT
Start: 2024-05-21 | End: 2024-05-22 | Stop reason: HOSPADM

## 2024-05-21 RX ADMIN — LOSARTAN POTASSIUM 50 MG: 50 TABLET, FILM COATED ORAL at 17:38

## 2024-05-21 RX ADMIN — ATORVASTATIN CALCIUM 80 MG: 80 TABLET, FILM COATED ORAL at 17:38

## 2024-05-21 RX ADMIN — DAPAGLIFLOZIN 10 MG: 10 TABLET, FILM COATED ORAL at 10:33

## 2024-05-21 RX ADMIN — PRASUGREL 10 MG: 10 TABLET, FILM COATED ORAL at 05:45

## 2024-05-21 RX ADMIN — OMEPRAZOLE 20 MG: 20 CAPSULE, DELAYED RELEASE ORAL at 17:37

## 2024-05-21 RX ADMIN — POLYETHYLENE GLYCOL 3350 1 PACKET: 17 POWDER, FOR SOLUTION ORAL at 16:19

## 2024-05-21 RX ADMIN — METOPROLOL TARTRATE 50 MG: 50 TABLET, FILM COATED ORAL at 05:41

## 2024-05-21 RX ADMIN — ASPIRIN 81 MG: 81 TABLET, COATED ORAL at 05:41

## 2024-05-21 RX ADMIN — OMEPRAZOLE 20 MG: 20 CAPSULE, DELAYED RELEASE ORAL at 05:41

## 2024-05-21 RX ADMIN — TAMSULOSIN HYDROCHLORIDE 0.4 MG: 0.4 CAPSULE ORAL at 05:41

## 2024-05-21 RX ADMIN — SENNOSIDES AND DOCUSATE SODIUM 2 TABLET: 50; 8.6 TABLET ORAL at 17:37

## 2024-05-21 RX ADMIN — ENOXAPARIN SODIUM 40 MG: 100 INJECTION SUBCUTANEOUS at 17:39

## 2024-05-21 ASSESSMENT — PAIN DESCRIPTION - PAIN TYPE
TYPE: ACUTE PAIN;CHRONIC PAIN
TYPE: ACUTE PAIN
TYPE: ACUTE PAIN;CHRONIC PAIN

## 2024-05-21 ASSESSMENT — COGNITIVE AND FUNCTIONAL STATUS - GENERAL
MOBILITY SCORE: 24
SUGGESTED CMS G CODE MODIFIER MOBILITY: CH

## 2024-05-21 ASSESSMENT — ENCOUNTER SYMPTOMS
HEADACHES: 0
DIZZINESS: 0
SPUTUM PRODUCTION: 0
NAUSEA: 0
RESPIRATORY NEGATIVE: 1
WHEEZING: 0
BRUISES/BLEEDS EASILY: 0
VOMITING: 0
NEUROLOGICAL NEGATIVE: 1
WEAKNESS: 0
CHILLS: 0
CARDIOVASCULAR NEGATIVE: 1
ORTHOPNEA: 0
SHORTNESS OF BREATH: 0
COUGH: 0
CLAUDICATION: 0
GASTROINTESTINAL NEGATIVE: 1
PND: 0
MUSCULOSKELETAL NEGATIVE: 1
CONSTITUTIONAL NEGATIVE: 1
ABDOMINAL PAIN: 0
BLOOD IN STOOL: 0
FEVER: 0
EYES NEGATIVE: 1
PSYCHIATRIC NEGATIVE: 1
PALPITATIONS: 0

## 2024-05-21 ASSESSMENT — GAIT ASSESSMENTS
DISTANCE (FEET): 125
GAIT LEVEL OF ASSIST: SUPERVISED

## 2024-05-21 ASSESSMENT — FIBROSIS 4 INDEX: FIB4 SCORE: 2.01

## 2024-05-21 NOTE — CARE PLAN
Problem: Pain - Standard  Goal: Alleviation of pain or a reduction in pain to the patient’s comfort goal  Outcome: Progressing     Problem: Knowledge Deficit - Standard  Goal: Patient and family/care givers will demonstrate understanding of plan of care, disease process/condition, diagnostic tests and medications  Outcome: Progressing     Problem: Hemodynamics  Goal: Patient's hemodynamics, fluid balance and neurologic status will be stable or improve  Outcome: Progressing     Problem: Fluid Volume  Goal: Fluid volume balance will be maintained  Outcome: Progressing     Problem: Urinary - Renal Perfusion  Goal: Ability to achieve and maintain adequate renal perfusion and functioning will improve  Outcome: Progressing     Problem: Respiratory  Goal: Patient will achieve/maintain optimum respiratory ventilation and gas exchange  Outcome: Progressing     Problem: Mechanical Ventilation  Goal: Safe management of artificial airway and ventilation  Outcome: Progressing  Goal: Successful weaning off mechanical ventilator, spontaneously maintains adequate gas exchange  Outcome: Progressing  Goal: Patient will be able to express needs and understand communication  Outcome: Progressing     Problem: Physical Regulation  Goal: Diagnostic test results will improve  Outcome: Progressing  Goal: Signs and symptoms of infection will decrease  Outcome: Progressing     Problem: Fall Risk  Goal: Patient will remain free from falls  Outcome: Progressing   The patient is Watcher - Medium risk of patient condition declining or worsening    Shift Goals  Clinical Goals: VSS, D/C  Patient Goals: Rest  Family Goals: go home    Progress made toward(s) clinical / shift goals:  VSS, optimize cardiac home medications    Patient is not progressing towards the following goals:

## 2024-05-21 NOTE — CARE PLAN
Problem: Pain - Standard  Goal: Alleviation of pain or a reduction in pain to the patient’s comfort goal  Outcome: Progressing     Problem: Knowledge Deficit - Standard  Goal: Patient and family/care givers will demonstrate understanding of plan of care, disease process/condition, diagnostic tests and medications  Outcome: Progressing     Problem: Hemodynamics  Goal: Patient's hemodynamics, fluid balance and neurologic status will be stable or improve  Outcome: Progressing     Problem: Fluid Volume  Goal: Fluid volume balance will be maintained  Outcome: Progressing     Problem: Urinary - Renal Perfusion  Goal: Ability to achieve and maintain adequate renal perfusion and functioning will improve  Outcome: Progressing     Problem: Respiratory  Goal: Patient will achieve/maintain optimum respiratory ventilation and gas exchange  Outcome: Progressing     Problem: Mechanical Ventilation  Goal: Safe management of artificial airway and ventilation  Outcome: Progressing  Goal: Successful weaning off mechanical ventilator, spontaneously maintains adequate gas exchange  Outcome: Progressing  Goal: Patient will be able to express needs and understand communication  Outcome: Progressing     Problem: Physical Regulation  Goal: Diagnostic test results will improve  Outcome: Progressing  Goal: Signs and symptoms of infection will decrease  Outcome: Progressing     Problem: Fall Risk  Goal: Patient will remain free from falls  Outcome: Progressing   The patient is Stable - Low risk of patient condition declining or worsening    Shift Goals  Clinical Goals: monitor VSS  Patient Goals: rest  Family Goals: go home    Progress made toward(s) clinical / shift goals:  patient remaining free from chest pain. Vital signs remain stable.    Patient is not progressing towards the following goals:

## 2024-05-21 NOTE — PROGRESS NOTES
Bedside report recieved and patient care assumed. Pt is resting in bed, A&O 4 with no complaints of pain, and is on room air. Tele box on, all fall precautions are in place, belongings at bedisde table. Pt was updated on POC, no questions or concerns. Patient educated on the use of call light for assistance.

## 2024-05-21 NOTE — THERAPY
Name: Girish Greenberg ADMIT: 2023   : 1953  PCP: Regina Landis MD    MRN: [id number] LOS: 0 days   AGE/SEX: 69 y.o. male  ROOM: [facility]     Subjective   Subjective   Patient appears comfortable and in no apparent distress.    Requesting diet when able.  Wife and RN at bedside.       Objective   Objective   Vital Signs  Temp:  [96.5 °F (35.8 °C)-97.8 °F (36.6 °C)] 97.8 °F (36.6 °C)  Heart Rate:  [71-88] 78  Resp:  [16-18] 16  BP: (121-130)/(73-78) 125/78  SpO2:  [92 %-97 %] 97 %  on   ;   Device (Oxygen Therapy): room air  Body mass index is 27.54 kg/m².    Physical Exam  Constitutional:       General: He is not in acute distress.     Appearance: He is not toxic-appearing.   Cardiovascular:      Rate and Rhythm: Normal rate and regular rhythm.   Pulmonary:      Effort: Pulmonary effort is normal.      Breath sounds: Normal breath sounds.   Abdominal:      General: Bowel sounds are normal.      Palpations: Abdomen is soft.   Musculoskeletal:      Right lower leg: No edema.      Left lower leg: No edema.   Skin:     General: Skin is warm and dry.   Neurological:      Mental Status: He is alert.     Results Review     I reviewed the patient's new clinical results.  Results from last 7 days   Lab Units 23  0812 23  0320   WBC 10*3/mm3 7.49 8.70   HEMOGLOBIN g/dL 12.3* 13.2   PLATELETS 10*3/mm3 330 359     Results from last 7 days   Lab Units 23  0812 23  0320   SODIUM mmol/L 139 138   POTASSIUM mmol/L 3.9 4.8   CHLORIDE mmol/L 104 103   CO2 mmol/L 25.0 25.1   BUN mg/dL 14 33*   CREATININE mg/dL 0.69* 0.91   GLUCOSE mg/dL 111* 124*   EGFR mL/min/1.73 101.4 92.4     Results from last 7 days   Lab Units 23  0320   ALBUMIN g/dL 4.0   BILIRUBIN mg/dL 0.3   ALK PHOS U/L 39   AST (SGOT) U/L 18   ALT (SGPT) U/L 18     Results from last 7 days   Lab Units 23  0812 23  0320   CALCIUM mg/dL 9.4 10.5   ALBUMIN g/dL  --  4.0     Results from last 7 days   Lab Units 23  9006  Physical Therapy   Initial Evaluation     Patient Name: Ray Tesfaye  Age:  68 y.o., Sex:  male  Medical Record #: [id number]  Today's Date: 5/21/2024     Precautions  Precautions: Cardiac Precautions (See Comments)  Comments: EF=45%    Assessment  Patient is 68 y.o. male with a diagnosis of NSTEMI, s/p PCI proximal to mid LAD (4.0 x 30 mm Jerod ROYCE .  Additional factors influencing patient status / progress : pt is seen for phase I CR education with handout given and reviewed.Topics covered included return to gentle exercise (pt plans to use stationary bike that he has at home due to arthritic knee), with gradual progression using RPE scale and talk test to monitor pace. Phase II CR was introduced. Pt lives with his spouse and son lives close by who can assist. See details below. .      Plan         DC Equipment Recommendations: None  Discharge Recommendations: Other - (phase II CR)            Objective       05/21/24 1045   Charge Group   PT Evaluation PT Evaluation Mod   PT Self Care / Home Evaluation (Units) 1   Total Time Spent   PT Total Time Yes   PT Evaluation Time Spent (Mins) 30   PT Self Care/Home Evaluation Time Spent (Mins) 15   PT Total Time Spent (Calculated) 45    Services   Is patient using  services for this encounter?   (pt preferred to call his son for assist with translation)   Initial Contact Note    Initial Contact Note Order Received and Verified, Evaluation Only - Patient Does Not Require Further Acute Physical Therapy at this Time.  However, May Benefit from Post Acute Therapy for Higher Level Functional Deficits.   Precautions   Precautions Cardiac Precautions (See Comments)   Comments EF=45%   Vitals   O2 Delivery Device None - Room Air   Pain 0 - 10 Group   Therapist Pain Assessment   (chronic L knee pain, plans TKA ASAP)   Prior Living Situation   Prior Services None   Housing / Facility 1 Story House   Steps Into Home 3   Steps In Home 0   Rail Right Rail (Steps  06/05/23  0320   LACTATE mmol/L 1.1 1.2     No results found for: HGBA1C, POCGLU    MRI Pelvis With & Without Contrast    Result Date: 6/6/2023  1. 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 There is fluid signal within the posterior- superior pubic symphyseal joint that appears to communicate with the bladder lumen and there is surrounding inflammatory change with bladder wall thickening. There is also a low signal structure surrounded by fluid along the posterior margin of the pubic symphyseal joint that appears to represent a calcification or bone fragment within this collection that communicates with the urinary bladder lumen. 2. Healing bilateral sacral alar fractures.  This report was finalized on 6/6/2023 9:11 AM by Dr. Julián Aragon M.D.      CT Abdomen Pelvis With Contrast    Result Date: 6/5/2023  Electronically signed by Cr Don M.D. on 06-05-23 at 0553   I have personally reviewed all medications:  Scheduled Medications  budesonide-formoterol, 2 puff, Inhalation, BID - RT  cefTRIAXone, 2 g, Intravenous, Q24H  levothyroxine, 50 mcg, Oral, Daily  senna-docusate sodium, 2 tablet, Oral, BID  sodium chloride, 10 mL, Intravenous, Q12H  vancomycin, 1,000 mg, Intravenous, Q12H    Infusions  Pharmacy to dose vancomycin,   sodium chloride, 75 mL/hr, Last Rate: 75 mL/hr (06/06/23 0409)    Diet  Diet: Regular/House Diet; Texture: Regular Texture (IDDSI 7); Fluid Consistency: Thin (IDDSI 0)    I have personally reviewed:  [x]  Laboratory   [x]  Microbiology   [x]  Radiology   [x]  EKG/Telemetry  []  Cardiology/Vascular   []  Pathology    []  Records       Assessment/Plan     Active Hospital Problems    Diagnosis  POA    **Abnormal CT of the abdomen [R93.5]  Yes    History of prostate cancer [Z85.46]  Not Applicable    Abdominal pain, RLQ [R10.9]  Unknown    Hypothyroidism (acquired) [E03.9]  into Home)   Equipment Owned Single Point Cane   Lives with - Patient's Self Care Capacity Spouse  (can assist. Son lives close by and can also assist)   Prior Level of Functional Mobility   Bed Mobility Independent   Transfer Status Independent   Ambulation Independent   Ambulation Distance community   Assistive Devices Used Single Point Cane   Stairs Independent   Cognition    Cognition / Consciousness WDL   Level of Consciousness Alert   Active ROM Lower Body    Active ROM Lower Body  X   Comments B LE functional for transfers, ambulation but pt with chronic L knee pain   Strength Lower Body   Lower Body Strength  WDL   Comments functional intact, no buckling today. Pt reports L knee buckled once previously   Balance Assessment   Sitting Balance (Static) Good   Sitting Balance (Dynamic) Good   Standing Balance (Static) Fair   Standing Balance (Dynamic) Fair   Weight Shift Sitting Good   Weight Shift Standing Good   Comments with no AD   Bed Mobility    Supine to Sit   (up on couch)   Sit to Supine   (wants to stay up on couch)   Scooting Supervised  (seated)   Gait Analysis   Gait Level Of Assist Supervised   Assistive Device None   Distance (Feet) 125   # of Times Distance was Traveled 1   # of Stairs Climbed 3   Level of Assist with Stairs Supervised   Weight Bearing Status no restrictions   Functional Mobility   Sit to Stand Supervised   Bed, Chair, Wheelchair Transfer Supervised   Transfer Method Stand Pivot   6 Clicks Assessment - How much HELP from from another person do you currently need... (If the patient hasn't done an activity recently, how much help from another person do you think he/she would need if he/she tried?)   Turning from your back to your side while in a flat bed without using bedrails? 4   Moving from lying on your back to sitting on the side of a flat bed without using bedrails? 4   Moving to and from a bed to a chair (including a wheelchair)? 4   Standing up from a chair using your arms   Unknown      Resolved Hospital Problems   No resolved problems to display.       Mr. Greenberg is a 69 y.o. former smoker with a history of COPD, GERD, BPH, prostate cancer that presents to [facility] complaining of abdominal pain.       Abnormal CT of the abdomen:   MRI pelvis reviewed--septic arthritis & surrounding osteomyelitis.  Urology following management of fistula & request Sanchez placed & RN unsuccessful; therefore, plan procedure at bedside today.  Blood cultures x2 NGTD & urine culture growing >100,000 gram negative bacilli.  ID following recommend ceftriaxone & vancomycin--anticipate 6 weeks course antibiotics.   PT signed off.  Falls precautions.       History of prostate cancer: Status post prostatectomy confirmed on CT.  Consult urology given irregular soft tissue thickening of bladder base extending to pubic symphysis with associated erosive changes of bilateral medial pubic bones.       Abdominal pain, RLQ: Symptom management with IV morphine as needed.  Continuous pulse oximetry.  Avoid sedation.  Bowel regimen provided.  Diet resumed given patient hungry.       Hypothyroidism (acquired): Levothyroxine continued.       Reported history of COVID 19 diagnosed on 6/2/2023.  Status post 3 days course Paxlovid.  PCR negative COVID-19 this admission.  Discontinue Paxlovid.  Oxygen saturation 99% on room air.  Infection control maintains isolation for 10 days from positive test ending on 6/12/2023.      SCDs for DVT prophylaxis.  Full code.  Discussed with patient and nursing staff.  Anticipate discharge home with family when cleared by consultants..      DIONE Parra  [facility]  06/06/23  16:08 EDT       (e.g., wheelchair, or bedside chair)? 4   Walking in hospital room? 4   Climbing 3-5 steps with a railing? 4   6 clicks Mobility Score 24   Activity Tolerance   Standing 5 min   Education Group   Education Provided Cardiac Precautions   Cardiac Precautions Patient Response Patient;Acceptance;Explanation;Verbal Demonstration   Additional Comments phase I CR   Anticipated Discharge Equipment and Recommendations   DC Equipment Recommendations None   Discharge Recommendations Other -  (phase II CR)   Interdisciplinary Plan of Care Collaboration   IDT Collaboration with  Nursing   Patient Position at End of Therapy Seated;Call Light within Reach;Phone within Reach   Collaboration Comments nsg updated   Session Information   Date / Session Number  5/21-1x only, dc needs only

## 2024-05-21 NOTE — PROGRESS NOTES
Monitor Summary:    Rhythm: SR  Rate: 62-73  Ectopy: (R) PVC, (R) Couplet  0.16/0.09/0.38

## 2024-05-21 NOTE — PROGRESS NOTES
[facility] Medicine Daily Progress Note    Date of Service  5/21/2024    Chief Complaint  Ray Tesfaye is a 68 y.o. male admitted 5/19/2024 with CP    Hospital Course  Ray Tesfaye is a 68 y.o. morbidly obese male with history of tobacco abuse (quit 01/2024), arthritis who presented 5/19/2024 with evaluation for chest pain.  History obtained with the assistance of patient's son at bedside in ER as patient is poor historian. Patient reported having sudden onset of left-sided chest discomfort while he was sitting down at the end of her dinner meal.  He also reported having heavy sensation of upper extremities.  His wife call patient's son, who subsequently called 911.  Initially brought to ER by EMS for STEMI.  However, evaluated by cardiology (Dr. Soares) in ER, STEMI alert aborted asked criteria not met.  No acute finding on thoracoabdominal scan, initial troponin T 22, increased to 219 on repeat.  Patient has been initiated on heparin drip.  Admission requested by ERP.  Therefore, admitted to medicine service for further evaluation treatment.     S/P LH-C w/LAD PCI and ROYCE on 5/20     Interval Problem Update  In NAD this AM, no CP  Stable VS  On RA  Continue DAPT  Continue Statin  Cardiology following and triturating Metop  Cardiac monitoring  Labs on AM    I have discussed this patient's plan of care and discharge plan at IDT rounds today with Case Management, Nursing, Nursing leadership, and other members of the IDT team.    Consultants/Specialty  cardiology    Code Status  Full Code    Disposition  The patient is not medically cleared for discharge to home or a post-acute facility.  Anticipate discharge to: home with close outpatient follow-up    I have placed the appropriate orders for post-discharge needs.    Review of Systems  Review of Systems   Constitutional: Negative.    HENT: Negative.     Eyes: Negative.    Respiratory: Negative.     Cardiovascular: Negative.    Gastrointestinal: Negative.     Genitourinary: Negative.    Musculoskeletal: Negative.    Skin: Negative.    Neurological: Negative.    Endo/Heme/Allergies: Negative.    Psychiatric/Behavioral: Negative.          Physical Exam  Temp:  [36.5 °C (97.7 °F)-36.9 °C (98.4 °F)] 36.7 °C (98.1 °F)  Pulse:  [60-83] 82  Resp:  [16-20] 18  BP: (106-144)/(63-87) 114/73  SpO2:  [93 %-96 %] 96 %    Physical Exam  Constitutional:       General: He is not in acute distress.     Appearance: Normal appearance.   HENT:      Head: Normocephalic and atraumatic.      Nose: Nose normal. No congestion.      Mouth/Throat:      Mouth: Mucous membranes are moist.   Eyes:      Extraocular Movements: Extraocular movements intact.      Pupils: Pupils are equal, round, and reactive to light.   Cardiovascular:      Rate and Rhythm: Normal rate and regular rhythm.      Pulses: Normal pulses.      Heart sounds: Normal heart sounds.   Pulmonary:      Effort: Pulmonary effort is normal.      Breath sounds: Normal breath sounds.   Abdominal:      General: Bowel sounds are normal.      Palpations: Abdomen is soft.      Tenderness: There is no abdominal tenderness.   Musculoskeletal:         General: No swelling. Normal range of motion.      Cervical back: Normal range of motion and neck supple.   Skin:     General: Skin is warm.      Coloration: Skin is not jaundiced.   Neurological:      General: No focal deficit present.      Mental Status: He is alert and oriented to person, place, and time. Mental status is at baseline.      Cranial Nerves: No cranial nerve deficit.   Psychiatric:         Mood and Affect: Mood normal.         Behavior: Behavior normal.         Thought Content: Thought content normal.         Judgment: Judgment normal.         Fluids    Intake/Output Summary (Last 24 hours) at 5/21/2024 1113  Last data filed at 5/21/2024 0430  Gross per 24 hour   Intake 360 ml   Output 550 ml   Net -190 ml       Laboratory  Recent Labs     05/19/24  5862 05/20/24  3995  05/21/24  0409   WBC 12.0* 16.0* 15.5*   RBC 5.04 5.00 5.01   HEMOGLOBIN 16.5 16.0 16.1   HEMATOCRIT 45.6 44.6 44.9   MCV 90.5 89.2 89.6   MCH 32.7 32.0 32.1   MCHC 36.2 35.9 35.9   RDW 42.1 41.3 41.9   PLATELETCT 222 219 220   MPV 9.3 9.4 9.5     Recent Labs     05/19/24  2345 05/20/24  0751 05/21/24  0409   SODIUM 141 137 138   POTASSIUM 4.0 4.1 4.1   CHLORIDE 103 103 103   CO2 23 20 22   GLUCOSE 191* 170* 153*   BUN 13 12 9   CREATININE 0.92 0.67 0.67   CALCIUM 9.4 9.4 8.9     Recent Labs     05/20/24  0251   APTT 31.8   INR 1.21*         Recent Labs     05/20/24  0751   TRIGLYCERIDE 133   HDL 45   *       Imaging  EC-ECHOCARDIOGRAM COMPLETE W/ CONT   Final Result      CT-CTA COMPLETE THORACOABDOMINAL AORTA   Final Result         1.  No aortic aneurysm or dissection identified.         DX-CHEST-PORTABLE (1 VIEW)   Final Result         1.  No acute cardiopulmonary disease.      CL-LEFT HEART CATHETERIZATION WITH POSSIBLE INTERVENTION    (Results Pending)        Assessment/Plan  * NSTEMI (non-ST elevated myocardial infarction) ([facility])- (present on admission)  Assessment & Plan  Troponin T 22 >> 219  Trend CE, EKG  CTA complete thoracoabdominal scan: No dissection, no PE    Heparin drip  ASA/statin  As needed nitro SL, morphine  Check echo  Consult cardiology in a.m.    Troponin went up to 347, concern for possible STEMI, stat EKG, discussed with cardiology team,discussed with bed side nurse, continue heparin drip, patient will require cath today. Continue monitoring telemetry.     Former heavy tobacco smoker  Assessment & Plan  Quit smoking 01/2024  Continue     Class 3 severe obesity in adult ([facility])  Assessment & Plan  Diet and lifestyle modification  Body mass index is 41.35 kg/m².      Unstable angina ([facility])  Assessment & Plan  Plan as above    Hyperglycemia  Assessment & Plan  Check HbA1c    Lumbar disc disease- (present on admission)  Assessment & Plan  Per history  Supportive care    GERD (gastroesophageal  reflux disease)- (present on admission)  Assessment & Plan  Omeprazole         VTE prophylaxis:    enoxaparin ppx    I have performed a physical exam and reviewed and updated ROS and Plan today (5/21/2024). In review of yesterday's note (5/20/2024), there are no changes except as documented above.

## 2024-05-21 NOTE — PROGRESS NOTES
Cardiology Follow-up Note    Name:   Ray Tesfaye   YOB: 1955  Age:   68 y.o.  male   MRN:   [id number]        Attending Provider: Dr Conrado Doan*     Chief Complaint: Chest Pain (Pt report left side chest pressure 8/10 x last night. Denies SOB. Denies Cough, denies Dizziness)       Reason for cardiology consult: Chest pain      History of Present Illness  Ray Tesfaye is 68 y.o. male with prior medical history significant for  tobacco abuse (quit 01/2024), nonrheumatic arthritis who was admitted on 5/19/2024 with sudden onset substernal chest pressure radiating to his back and arm. EKG is concerning for anterior ST elevation with reciprocal changes. This was deemed to represent a non-STEMI. Troponins have trended up to 3470 from initial value of 22.   Patient was taken to Cath Lab on 5/20/2024, found to have 99% subtotal thrombotic acutely occluded proximal LAD.  He was successfully treated with a stent to proximal to mid LAD.    Interim Events   :  - Personal Telemetry interpretation: Sinus rhythm with rare PVCs and PACs.  - Overnight events: Currently, patient denies angina.  Denies shortness of breath, lightheadedness, palpitations, swelling.  Ambulates without difficulty.  - Vitals: 108/69, room air  - Labs reviewed: Glucose 153, serum creatinine 0.67, magnesium 1.9, potassium 4.1          Review of Systems   Constitutional:  Negative for chills, fever and malaise/fatigue.   Respiratory:  Negative for cough, sputum production, shortness of breath and wheezing.    Cardiovascular:  Negative for chest pain, palpitations, orthopnea, claudication, leg swelling and PND.   Gastrointestinal:  Negative for abdominal pain, blood in stool, nausea and vomiting.   Musculoskeletal: Negative.    Neurological:  Negative for dizziness, weakness and headaches.   Endo/Heme/Allergies:  Does not bruise/bleed easily.   Psychiatric/Behavioral: Negative.          Medical History  Past Medical History:  "  Diagnosis Date    Arthritis     ED (erectile dysfunction)     GERD (gastroesophageal reflux disease) 2011    Osteoarthritis of lumbar spine 2011         Family History   Problem Relation Age of Onset    Stroke Mother          Social History     Tobacco Use    Smoking status: Former     Current packs/day: 0.00     Types: Cigarettes     Quit date: 2011     Years since quittin.9    Smokeless tobacco: Never   Vaping Use    Vaping status: Never Used   Substance Use Topics    Alcohol use: Never     Comment: Rare useage    Drug use: No         No Known Allergies      Medications   Scheduled Medications   Medication Dose Frequency    losartan  50 mg Q EVENING    atorvastatin  80 mg Q EVENING    [START ON 2024] metoprolol SR  50 mg Q DAY    dapagliflozin propanediol  10 mg DAILY    senna-docusate  2 Tablet Q EVENING    tamsulosin  0.4 mg DAILY    omeprazole  20 mg BID    aspirin  81 mg DAILY    prasugrel  10 mg DAILY           Physical Exam    Body mass index is 40.92 kg/m².     /73   Pulse 82   Temp 36.7 °C (98.1 °F) (Temporal)   Resp 18   Ht 1.676 m (5' 6\")   Wt 115 kg (253 lb 8.5 oz)   SpO2 96%      Vitals:    24 1900 24 0018 24 0430 24 0808   BP: 132/73 134/73 106/72 114/73   Pulse: 70 70 72 82   Resp:  18   Temp: 36.8 °C (98.2 °F) 36.5 °C (97.7 °F) 36.7 °C (98.1 °F) 36.7 °C (98.1 °F)   TempSrc: Temporal Temporal Temporal Temporal   SpO2: 94% 94% 95% 96%   Weight:       Height:            Oxygen Therapy:  Pulse Oximetry: 96 %, O2 (LPM): 0, O2 Delivery Device: None - Room Air    BP Readings from Last 4 Encounters:   24 114/73   23 124/84   20 132/82   20 150/90       Wt Readings from Last 4 Encounters:   24 115 kg (253 lb 8.5 oz)   23 120 kg (264 lb 4.8 oz)   20 113 kg (250 lb)   20 113 kg (250 lb)         Physical Exam  Vitals and nursing note reviewed.   Constitutional:       General: He is not in " "acute distress.     Appearance: Normal appearance. He is obese.   HENT:      Head: Normocephalic and atraumatic.      Mouth/Throat:      Mouth: Mucous membranes are moist.   Eyes:      Pupils: Pupils are equal, round, and reactive to light.   Neck:      Vascular: No JVD.   Cardiovascular:      Rate and Rhythm: Normal rate and regular rhythm. Occasional Extrasystoles are present.     Pulses: Normal pulses.      Heart sounds: Normal heart sounds. No murmur heard.     No friction rub. No gallop.   Pulmonary:      Effort: Pulmonary effort is normal.      Breath sounds: Normal breath sounds. No wheezing or rhonchi.   Abdominal:      General: Bowel sounds are normal. There is no distension.      Palpations: Abdomen is soft.   Musculoskeletal:         General: No swelling.      Right lower leg: No edema.      Left lower leg: No edema.   Skin:     General: Skin is warm and dry.      Comments: Right radial site is soft, without evidence of hematoma   Neurological:      General: No focal deficit present.      Mental Status: He is alert and oriented to person, place, and time. Mental status is at baseline.   Psychiatric:         Mood and Affect: Mood and affect normal.         Behavior: Behavior normal.         Thought Content: Thought content normal.         Cognition and Memory: Memory normal.         Judgment: Judgment normal.               Labs (personally reviewed):     Estimated Creatinine Clearance: 129.3 mL/min (by C-G formula based on SCr of 0.67 mg/dL).    No results found for: \"BNPBTYPENAT\"  Recent Labs     05/19/24 2345 05/20/24  0751 05/21/24  0409   CREATININE 0.92 0.67 0.67   BUN 13 12 9   POTASSIUM 4.0 4.1 4.1   SODIUM 141 137 138   CALCIUM 9.4 9.4 8.9   MAGNESIUM  --  1.8 1.9   CO2 23 20 22   ALBUMIN 4.3 4.2  --      Recent Labs     05/19/24 2345 05/20/24  0751 05/21/24  0409   GLUCOSE 191* 170* 153*     Recent Labs     05/19/24 2345 05/20/24  0251   ASTSGOT 44  --    ALTSGPT 43  --    ALKPHOSPHAT 88  --  "   INR  --  1.21*     Recent Labs     05/19/24  2345 05/20/24  0751 05/21/24  0409   WBC 12.0* 16.0* 15.5*   HEMOGLOBIN 16.5 16.0 16.1   PLATELETCT 222 219 220     Recent Labs     05/19/24  2345 05/20/24  0204 05/20/24  0751   TROPONINT 22* 219* 3470*   NTPROBNP  --   --  309*   HBA1C  --   --  6.4*     Lab Results   Component Value Date/Time    CHOLSTRLTOT 271 (H) 05/20/2024 07:51 AM     (H) 05/20/2024 07:51 AM    HDL 45 05/20/2024 07:51 AM    TRIGLYCERIDE 133 05/20/2024 07:51 AM       Imaging:  EC-ECHOCARDIOGRAM COMPLETE W/ CONT   Final Result      CT-CTA COMPLETE THORACOABDOMINAL AORTA   Final Result         1.  No aortic aneurysm or dissection identified.         DX-CHEST-PORTABLE (1 VIEW)   Final Result         1.  No acute cardiopulmonary disease.      CL-LEFT HEART CATHETERIZATION WITH POSSIBLE INTERVENTION    (Results Pending)       Cardiac Imaging and Procedures Review      Echo 5/20/2024  Echocardiography Laboratory     CONCLUSIONS  Mildly reduced left ventricular systolic function. The left ventricular   ejection fraction is visually estimated to be 45%.  Septal and apical   hypokinesis.   Mild concentric left ventricular hypertrophy. Grade I diastolic   dysfunction.  No significant valvular abnormalities.   No prior study is available for comparison.       [facility] 5/20/2024  Cardiac Catheterization report  INDICATION/PREOPERATIVE DIAGNOSIS:  High risk acute coronary syndrome  Ongoing symptoms despite medical therapy  Urgent/emergent coronary angiography     POSTOPERATIVE DIAGNOSIS:  99% subtotal thrombotic acutely occluded proximal LAD single-vessel disease  LVEF 45%, LVEDP 10 mmHg  Successful IVUS guided PCI proximal to mid LAD (4.0 x 30 mm Jerod ROYCE postdilated to 4.75 mm proximally), excellent result     RECOMMENDATIONS:  Guideline directed medical therapy   Cardiovascular Risk factor modification  Dual antiplatelet therapy for minimum 1 year     PROCEDURES PERFORMED:  Coronary arteriograms  Left  heart catheterization and Left ventriculogram   Supervision moderate sedation  Percutaneous coronary intervention  Intravascular ultrasound LAD     FINDINGS:  I.  HEMODYNAMICS              Ao: 105/77 mmHg              LEDP: 10 mmHg              Gradient on LV pullback: No     II. CORONARY ANGIOGRAPHY:  Left main coronary artery: Large bifurcating no CAD  Left anterior descending artery: Large-caliber with an acute thrombotic proximal to mid 99% subtotal stenosis with FRANCIS I flow involving a diagonal and a Parson 1, 1, 0 configuration.  Post-PCI there is 0% residual stenosis and no sidebranch compromise, FRANCIS-3 flow.  Left circumflex coronary artery: Moderate caliber no CAD  Right coronary artery: Large-caliber dominant no CAD     III.  LEFT VENTRICULOGRAM:  LVEF BUSH PROJECTION: 45%    Principal Problem:    NSTEMI (non-ST elevated myocardial infarction) (HCC) (POA: Yes)  Active Problems:    GERD (gastroesophageal reflux disease) (POA: Yes)    Lumbar disc disease (POA: Yes)    Hyperglycemia (POA: Unknown)    Unstable angina (HCC) (POA: Unknown)    Class 3 severe obesity in adult (HCC) (POA: Unknown)    Former heavy tobacco smoker (POA: Unknown)  Resolved Problems:    * No resolved hospital problems. *      Assessment and Medical Decision Making:    # NSTEMI    -ASA  -Prasugrel 10 mg daily  -[facility] 5/20/2024 -  found to have 99% subtotal thrombotic acutely occluded proximal LAD.  He was successfully treated with a stent to proximal to mid LAD (4.0 x 30 mm Jerod ROYCE).  -DAPT for at least 12 months  -Mildly reduced LV function with EF 45%  -Given A1c 6.4 and EF 45%, -add Farxiga 10 mg daily  -HI Statin, increase  - atorvastatin 80 mg every evening for goal LDL less than 70  -BB -change metoprolol to tartrate to Toprol-XL 50 milligram daily  -Add losartan 50 mg daily  -Meds-to-beds on discharge  -Cardiac rehab referral placed  Education   -Radial site care instructions provided  -Discussed returning to ER if chest pain  returns and/or call cardiology office at [phone] with any additional new or worsening symptoms  -Discussed CV risk factor modification including cholesterol management, blood pressure management, smoking cessation, diet and lifestyle changes.     # Obesity  # Tobacco abuse   quit 01/20/2024.  Recommend ongoing cessation    # A1c 6.4, hyperglycemia   Add farxiga 10 mg daily          Future Appointments   Date Time Provider Department Center   6/13/2024  3:45 PM ELY Sánchez None        Monitor today while adjusting medication regiment.  Potential discharge tomorrow morning    I personally discussed his case with  Dr Conrado Doan*    .appt    Please contact me with any questions.  Thank you for allowing us to participate in the care of Mr. Tesfaye.      Please see Dr. Whitlock  attestation for further details and MDM.     I, ELY Cabrera performed a portion of the service face-to-face with the same  patient on the same date of service INDEPENDENTLY OF Dr. Whitlock FOR 15 MINUTES. preparing to see the patient, reviewing hospital notes and tests, obtaining history from the patient, performing a medically appropriate exam, counseling and educating the patient, ordering medications/tests/procedures/referrals as clinically indicated, and documenting information in the electronic medical record.    Please note this dictation was created using voice recognition software.  I have made every reasonable attempt to correct obvious errors, but there may be errors of grammar and possibly content that I did not discover before finalizing the note.    ELY Cabrera  [facility]  [phone]

## 2024-05-22 ENCOUNTER — PHARMACY VISIT (OUTPATIENT)
Dept: PHARMACY | Facility: MEDICAL CENTER | Age: 69
End: 2024-05-22
Payer: COMMERCIAL

## 2024-05-22 VITALS
HEART RATE: 84 BPM | HEIGHT: 66 IN | BODY MASS INDEX: 43.9 KG/M2 | OXYGEN SATURATION: 92 % | RESPIRATION RATE: 18 BRPM | DIASTOLIC BLOOD PRESSURE: 74 MMHG | TEMPERATURE: 97.7 F | WEIGHT: 273.15 LBS | SYSTOLIC BLOOD PRESSURE: 114 MMHG

## 2024-05-22 LAB
ANION GAP SERPL CALC-SCNC: 13 MMOL/L (ref 7–16)
BASOPHILS # BLD AUTO: 0.5 % (ref 0–1.8)
BASOPHILS # BLD: 0.05 K/UL (ref 0–0.12)
BUN SERPL-MCNC: 11 MG/DL (ref 8–22)
CALCIUM SERPL-MCNC: 9.3 MG/DL (ref 8.5–10.5)
CHLORIDE SERPL-SCNC: 99 MMOL/L (ref 96–112)
CO2 SERPL-SCNC: 24 MMOL/L (ref 20–33)
CREAT SERPL-MCNC: 0.85 MG/DL (ref 0.5–1.4)
EOSINOPHIL # BLD AUTO: 0.1 K/UL (ref 0–0.51)
EOSINOPHIL NFR BLD: 0.9 % (ref 0–6.9)
ERYTHROCYTE [DISTWIDTH] IN BLOOD BY AUTOMATED COUNT: 43.7 FL (ref 35.9–50)
GFR SERPLBLD CREATININE-BSD FMLA CKD-EPI: 96 ML/MIN/1.73 M 2
GLUCOSE SERPL-MCNC: 116 MG/DL (ref 65–99)
HCT VFR BLD AUTO: 48.9 % (ref 42–52)
HGB BLD-MCNC: 16.8 G/DL (ref 14–18)
IMM GRANULOCYTES # BLD AUTO: 0.04 K/UL (ref 0–0.11)
IMM GRANULOCYTES NFR BLD AUTO: 0.4 % (ref 0–0.9)
LYMPHOCYTES # BLD AUTO: 2.94 K/UL (ref 1–4.8)
LYMPHOCYTES NFR BLD: 26.8 % (ref 22–41)
MAGNESIUM SERPL-MCNC: 2 MG/DL (ref 1.5–2.5)
MCH RBC QN AUTO: 31.8 PG (ref 27–33)
MCHC RBC AUTO-ENTMCNC: 34.4 G/DL (ref 32.3–36.5)
MCV RBC AUTO: 92.6 FL (ref 81.4–97.8)
MONOCYTES # BLD AUTO: 1.13 K/UL (ref 0–0.85)
MONOCYTES NFR BLD AUTO: 10.3 % (ref 0–13.4)
NEUTROPHILS # BLD AUTO: 6.71 K/UL (ref 1.82–7.42)
NEUTROPHILS NFR BLD: 61.1 % (ref 44–72)
NRBC # BLD AUTO: 0 K/UL
NRBC BLD-RTO: 0 /100 WBC (ref 0–0.2)
PLATELET # BLD AUTO: 199 K/UL (ref 164–446)
PMV BLD AUTO: 9 FL (ref 9–12.9)
POTASSIUM SERPL-SCNC: 4.2 MMOL/L (ref 3.6–5.5)
RBC # BLD AUTO: 5.28 M/UL (ref 4.7–6.1)
SODIUM SERPL-SCNC: 136 MMOL/L (ref 135–145)
WBC # BLD AUTO: 11 K/UL (ref 4.8–10.8)

## 2024-05-22 PROCEDURE — 83735 ASSAY OF MAGNESIUM: CPT

## 2024-05-22 PROCEDURE — 99232 SBSQ HOSP IP/OBS MODERATE 35: CPT

## 2024-05-22 PROCEDURE — 99239 HOSP IP/OBS DSCHRG MGMT >30: CPT | Performed by: STUDENT IN AN ORGANIZED HEALTH CARE EDUCATION/TRAINING PROGRAM

## 2024-05-22 PROCEDURE — 700102 HCHG RX REV CODE 250 W/ 637 OVERRIDE(OP): Performed by: INTERNAL MEDICINE

## 2024-05-22 PROCEDURE — 85025 COMPLETE CBC W/AUTO DIFF WBC: CPT

## 2024-05-22 PROCEDURE — A9270 NON-COVERED ITEM OR SERVICE: HCPCS | Performed by: STUDENT IN AN ORGANIZED HEALTH CARE EDUCATION/TRAINING PROGRAM

## 2024-05-22 PROCEDURE — 700102 HCHG RX REV CODE 250 W/ 637 OVERRIDE(OP): Performed by: STUDENT IN AN ORGANIZED HEALTH CARE EDUCATION/TRAINING PROGRAM

## 2024-05-22 PROCEDURE — A9270 NON-COVERED ITEM OR SERVICE: HCPCS

## 2024-05-22 PROCEDURE — 700102 HCHG RX REV CODE 250 W/ 637 OVERRIDE(OP)

## 2024-05-22 PROCEDURE — 80048 BASIC METABOLIC PNL TOTAL CA: CPT

## 2024-05-22 PROCEDURE — A9270 NON-COVERED ITEM OR SERVICE: HCPCS | Performed by: INTERNAL MEDICINE

## 2024-05-22 PROCEDURE — RXMED WILLOW AMBULATORY MEDICATION CHARGE: Performed by: STUDENT IN AN ORGANIZED HEALTH CARE EDUCATION/TRAINING PROGRAM

## 2024-05-22 RX ORDER — ATORVASTATIN CALCIUM 80 MG/1
80 TABLET, FILM COATED ORAL EVERY EVENING
Qty: 30 TABLET | Refills: 0 | Status: SHIPPED | OUTPATIENT
Start: 2024-05-22 | End: 2024-06-20 | Stop reason: SDUPTHER

## 2024-05-22 RX ORDER — PRASUGREL 10 MG/1
10 TABLET, FILM COATED ORAL DAILY
Qty: 30 TABLET | Refills: 0 | Status: SHIPPED | OUTPATIENT
Start: 2024-05-22 | End: 2024-06-20 | Stop reason: SDUPTHER

## 2024-05-22 RX ORDER — METOPROLOL SUCCINATE 50 MG/1
50 TABLET, EXTENDED RELEASE ORAL DAILY
Qty: 30 TABLET | Refills: 0 | Status: SHIPPED | OUTPATIENT
Start: 2024-05-22 | End: 2024-06-20 | Stop reason: SDUPTHER

## 2024-05-22 RX ORDER — LOSARTAN POTASSIUM 50 MG/1
50 TABLET ORAL EVERY EVENING
Qty: 30 TABLET | Refills: 0 | Status: SHIPPED | OUTPATIENT
Start: 2024-05-22 | End: 2024-06-20 | Stop reason: SDUPTHER

## 2024-05-22 RX ORDER — DAPAGLIFLOZIN 10 MG/1
10 TABLET, FILM COATED ORAL DAILY
Qty: 30 TABLET | Refills: 0 | Status: SHIPPED | OUTPATIENT
Start: 2024-05-22 | End: 2024-06-20 | Stop reason: CLARIF

## 2024-05-22 RX ORDER — ASPIRIN 81 MG/1
81 TABLET ORAL DAILY
Qty: 100 TABLET | Refills: 0 | Status: SHIPPED | OUTPATIENT
Start: 2024-05-22 | End: 2024-06-20 | Stop reason: SDUPTHER

## 2024-05-22 RX ADMIN — PRASUGREL 10 MG: 10 TABLET, FILM COATED ORAL at 05:15

## 2024-05-22 RX ADMIN — METOPROLOL SUCCINATE 50 MG: 25 TABLET, EXTENDED RELEASE ORAL at 05:14

## 2024-05-22 RX ADMIN — OMEPRAZOLE 20 MG: 20 CAPSULE, DELAYED RELEASE ORAL at 05:15

## 2024-05-22 RX ADMIN — TAMSULOSIN HYDROCHLORIDE 0.4 MG: 0.4 CAPSULE ORAL at 05:15

## 2024-05-22 RX ADMIN — ASPIRIN 81 MG: 81 TABLET, COATED ORAL at 05:15

## 2024-05-22 RX ADMIN — DAPAGLIFLOZIN 10 MG: 10 TABLET, FILM COATED ORAL at 05:15

## 2024-05-22 ASSESSMENT — FIBROSIS 4 INDEX: FIB4 SCORE: 2.01

## 2024-05-22 ASSESSMENT — ENCOUNTER SYMPTOMS
COUGH: 0
NAUSEA: 0
PND: 0
ABDOMINAL PAIN: 0
ORTHOPNEA: 0
FEVER: 0
BLOOD IN STOOL: 0
SHORTNESS OF BREATH: 0
DIZZINESS: 0
PALPITATIONS: 0
WEAKNESS: 0
MUSCULOSKELETAL NEGATIVE: 1
HEADACHES: 0
PSYCHIATRIC NEGATIVE: 1
CLAUDICATION: 0
CHILLS: 0
BRUISES/BLEEDS EASILY: 0
VOMITING: 0
SPUTUM PRODUCTION: 0
WHEEZING: 0

## 2024-05-22 ASSESSMENT — PAIN DESCRIPTION - PAIN TYPE: TYPE: ACUTE PAIN

## 2024-05-22 NOTE — DISCHARGE SUMMARY
Discharge Summary    CHIEF COMPLAINT ON ADMISSION  Chief Complaint   Patient presents with    Chest Pain     Pt report left side chest pressure 8/10 x last night. Denies SOB. Denies Cough, denies Dizziness       Reason for Admission  EMS     Admission Date  5/19/2024    CODE STATUS  Full Code    HPI & HOSPITAL COURSE  This is a  68 y.o. morbidly obese male with history of tobacco abuse (quit 01/2024), arthritis who presented 5/19/2024 with evaluation for chest pain with heavy sensation of upper extremities. Initially he was brought to ER by EMS for concerning STEMI. He was evaluated by cardiology (Dr. Soares) in ER, STEMI alert was aborted as not meeting criteria of ST elevation. EKG did show evidence of coronary ischemia. CT angiogram did not show evidence of aortic dissection. He was started with heparin drip. Patient underwent LHC on 5/20 showing 99% subtotal thrombotic acutely occluded proximal LAD single-vessel disease with successful IVUS guided PCI proximal to mid LAD (4.0 x 30 mm Jerod ROYCE postdilated to 4.75 mm proximally), excellent result. Echo notes LVEF 45%.  No significant valvular abnormalities.  Cardiology recommended patient is discharged with DAPT with aspirin and the Effient, atorvastatin, Toprol, losartan and Farxiga and outpatient follow-up with cardiology as outpatient.     On the day of discharge, patient is hemodynamically stable.  Patient denies chest pain or shortness of breath.  Meds to bed arranged.  Smoking cessation discussed with the patient at bedside.  He voiced understanding.  He is advised to follow-up with PCP and cardiology as outpatient.    Therefore, he is discharged in good and stable condition to home with close outpatient follow-up.    The patient met 2-midnight criteria for an inpatient stay at the time of discharge.    Discharge Date  5/22/24    FOLLOW UP ITEMS POST DISCHARGE  PCP  cardiology    DISCHARGE DIAGNOSES  Principal Problem:    NSTEMI (non-ST elevated myocardial  infarction) (HCC) (POA: Yes)  Active Problems:    GERD (gastroesophageal reflux disease) (POA: Yes)    Lumbar disc disease (POA: Yes)    Hyperglycemia (POA: Unknown)    Unstable angina (HCC) (POA: Unknown)    Class 3 severe obesity in adult (HCC) (POA: Unknown)    Former heavy tobacco smoker (POA: Unknown)  Resolved Problems:    * No resolved hospital problems. *      FOLLOW UP  Future Appointments   Date Time Provider Department Center   6/13/2024  3:45 PM ELY Sánchez None     [facility]  [address]  [address]  [facility] [id number] [phone]  Call  Your doctor has referred you for Cardiac Rehab which is important in your recovery. Please call to make an appointment.    Marisol Burgos M.D.  [address]  [address]  [facility] [address]  [phone]    Follow up in 1 week(s)        MEDICATIONS ON DISCHARGE     Medication List        START taking these medications        Instructions   Aspirin Low Dose 81 MG EC tablet  Generic drug: aspirin   Take 1 Tablet by mouth every day.  Dose: 81 mg     atorvastatin 80 MG tablet  Commonly known as: Lipitor   Take 1 Tablet by mouth every evening.  Dose: 80 mg     Farxiga 10 MG Tabs  Generic drug: dapagliflozin propanediol   Take 1 Tablet by mouth every day.  Dose: 10 mg     losartan 50 MG Tabs  Commonly known as: Cozaar   Take 1 Tablet by mouth every evening.  Dose: 50 mg     metoprolol SR 50 MG Tb24  Commonly known as: Toprol XL   Take 1 Tablet by mouth every day.  Dose: 50 mg     prasugrel 10 MG Tabs  Commonly known as: Effient   Take 1 Tablet by mouth every day.  Dose: 10 mg            CONTINUE taking these medications        Instructions   tamsulosin 0.4 MG capsule  Commonly known as: Flomax   Take 0.4 mg by mouth every day.  Dose: 0.4 mg            STOP taking these medications      ibuprofen 200 MG Tabs  Commonly known as: Motrin              Allergies  No Known Allergies    DIET  Orders Placed This Encounter   Procedures     Diet Order Diet: Cardiac     Standing Status:   Standing     Number of Occurrences:   1     Order Specific Question:   Diet:     Answer:   Cardiac [6]       ACTIVITY  As tolerated.  Weight bearing as tolerated    CONSULTATIONS  cardiology    PROCEDURES  [facility]    LABORATORY  Lab Results   Component Value Date    SODIUM 138 05/21/2024    POTASSIUM 4.1 05/21/2024    CHLORIDE 103 05/21/2024    CO2 22 05/21/2024    GLUCOSE 153 (H) 05/21/2024    BUN 9 05/21/2024    CREATININE 0.67 05/21/2024        Lab Results   Component Value Date    WBC 11.0 (H) 05/22/2024    HEMOGLOBIN 16.8 05/22/2024    HEMATOCRIT 48.9 05/22/2024    PLATELETCT 199 05/22/2024      EC-ECHOCARDIOGRAM COMPLETE W/ CONT   Final Result      CT-CTA COMPLETE THORACOABDOMINAL AORTA   Final Result         1.  No aortic aneurysm or dissection identified.         DX-CHEST-PORTABLE (1 VIEW)   Final Result         1.  No acute cardiopulmonary disease.      CL-LEFT HEART CATHETERIZATION WITH POSSIBLE INTERVENTION    (Results Pending)         Total time of the discharge process 35 minutes.

## 2024-05-22 NOTE — PROGRESS NOTES
Patient being transported to [place] [address] via wheelchair. Called EVETTE Sidhu for report. Patient off tele, monitors notified, all belongings with patient.

## 2024-05-22 NOTE — CARE PLAN
The patient is Stable - Low risk of patient condition declining or worsening    Shift Goals  Clinical Goals: Pt will remain safe, be monitored for chest pain throughout shift  Patient Goals: rest, go home  Family Goals: updates    Progress made toward(s) clinical / shift goals:  Pt alert and oriented, able to make needs known. Medications administered per MAR. Denies chest pain and SOB throughout shift. Other needs met at this time. Bed locked in lowest position, call light and personal belongings in reach.    Patient is not progressing towards the following goals:

## 2024-05-22 NOTE — PROGRESS NOTES
4 Eyes Skin Assessment Completed by EVETTE Rodriguez and EVETTE Burton.    Head WDL  Ears WDL  Nose WDL  Mouth WDL  Neck WDL  Breast/Chest WDL  Shoulder Blades WDL  Spine WDL  (R) Arm/Elbow/Hand WDL  (L) Arm/Elbow/Hand WDL  Abdomen WDL  Groin WDL  Scrotum/Coccyx/Buttocks WDL  (R) Leg WDL  (L) Leg WDL  (R) Heel/Foot/Toe Redness and Blanching  (L) Heel/Foot/Toe Redness and Blanching          Interventions In Place N/A    Possible Skin Injury No    Pictures Uploaded Into Epic Yes  Wound Consult Placed N/A  RN Wound Prevention Protocol Ordered No

## 2024-05-22 NOTE — DISCHARGE PLANNING
Case Management Discharge Planning    Admission Date: 5/19/2024  GMLOS: 2  ALOS: 2    6-Clicks ADL Score: 24  6-Clicks Mobility Score: 24      Anticipated Discharge Dispo: Discharge Disposition: Discharged to home/self care (01)    DME Needed: No    Action(s) Taken: OTHER    This RN CM was updated that patient is anticipated to be medically cleared today, per physician concern for medication coverage, will follow up with Pharmacy once medications are ordered to ensure they are covered by insurance.     Escalations Completed: None    Medically Clear: Yes    Next Steps: Follow up with cost of medications, prior to pt returning home on discharge.    Barriers to Discharge: None    Is the patient up for discharge tomorrow: No

## 2024-05-22 NOTE — PROGRESS NOTES
Cardiology Follow-up Note    Name:   Ray Tesfaye   YOB: 1955  Age:   68 y.o.  male   MRN:   [id number]        Attending Provider: Dr Conrado Doan*     Chief Complaint: Chest Pain (Pt report left side chest pressure 8/10 x last night. Denies SOB. Denies Cough, denies Dizziness)       Reason for cardiology consult: Chest pain      History of Present Illness  Ray Tesfaye is 68 y.o. male with prior medical history significant for  tobacco abuse (quit 01/2024), nonrheumatic arthritis who was admitted on 5/19/2024 with sudden onset substernal chest pressure radiating to his back and arm. EKG is concerning for anterior ST elevation with reciprocal changes. This was deemed to represent a non-STEMI. Troponins have trended up to 3470 from initial value of 22.   Patient was taken to Cath Lab on 5/20/2024, found to have 99% subtotal thrombotic acutely occluded proximal LAD.  He was successfully treated with a stent to proximal to mid LAD.    Interim Events  5/21/2024 :  - Personal Telemetry interpretation: Sinus rhythm with rare PVCs and PACs.  - Overnight events: Currently, patient denies angina.  Denies shortness of breath, lightheadedness, palpitations, swelling.  Ambulates without difficulty.  - Vitals: 108/69, room air  - Labs reviewed: Glucose 153, serum creatinine 0.67, magnesium 1.9, potassium 4.1    Interim Events 5/22/2024:  - Personal Telemetry interpretation: medical status  - Overnight events: No cardiac events  - Vitals: 113/74, on room air  - Labs reviewed: Serum creatinine 0.67, glucose 153  - I/O's: 1.2 L out yesterday  - Weight: 273 pounds          Review of Systems   Constitutional:  Negative for chills, fever and malaise/fatigue.   Respiratory:  Negative for cough, sputum production, shortness of breath and wheezing.    Cardiovascular:  Negative for chest pain, palpitations, orthopnea, claudication, leg swelling and PND.   Gastrointestinal:  Negative for abdominal pain,  "blood in stool, nausea and vomiting.   Musculoskeletal: Negative.    Neurological:  Negative for dizziness, weakness and headaches.   Endo/Heme/Allergies:  Does not bruise/bleed easily.   Psychiatric/Behavioral: Negative.          Medical History  Past Medical History:   Diagnosis Date    Arthritis     ED (erectile dysfunction)     GERD (gastroesophageal reflux disease) 2011    Osteoarthritis of lumbar spine 2011         Family History   Problem Relation Age of Onset    Stroke Mother          Social History     Tobacco Use    Smoking status: Former     Current packs/day: 0.00     Types: Cigarettes     Quit date: 2011     Years since quittin.9    Smokeless tobacco: Never   Vaping Use    Vaping status: Never Used   Substance Use Topics    Alcohol use: Never     Comment: Rare useage    Drug use: No         No Known Allergies      Medications   Scheduled Medications   Medication Dose Frequency    losartan  50 mg Q EVENING    atorvastatin  80 mg Q EVENING    metoprolol SR  50 mg Q DAY    dapagliflozin propanediol  10 mg DAILY    enoxaparin (LOVENOX) injection  40 mg DAILY AT 1800    senna-docusate  2 Tablet Q EVENING    tamsulosin  0.4 mg DAILY    omeprazole  20 mg BID    aspirin  81 mg DAILY    prasugrel  10 mg DAILY           Physical Exam    Body mass index is 44.09 kg/m².     /74   Pulse 84   Temp 36.5 °C (97.7 °F) (Temporal)   Resp 18   Ht 1.676 m (5' 6\")   Wt 124 kg (273 lb 2.4 oz)   SpO2 92%      Vitals:    24 0107 24 0108 24 0502 24 0715   BP: 136/83  109/76 114/74   Pulse: 89  92 84   Resp: 18  18 18   Temp: 36.3 °C (97.4 °F)  36.4 °C (97.6 °F) 36.5 °C (97.7 °F)   TempSrc: Temporal  Temporal Temporal   SpO2: 95%  92% 92%   Weight:  124 kg (273 lb 2.4 oz)     Height:            Oxygen Therapy:  Pulse Oximetry: 92 %, O2 (LPM): 0, O2 Delivery Device: None - Room Air    BP Readings from Last 4 Encounters:   24 114/74   23 124/84   20 132/82 " "  11/21/20 150/90       Wt Readings from Last 4 Encounters:   05/22/24 124 kg (273 lb 2.4 oz)   08/24/23 120 kg (264 lb 4.8 oz)   11/23/20 113 kg (250 lb)   11/21/20 113 kg (250 lb)         Physical Exam  Vitals and nursing note reviewed.   Constitutional:       General: He is not in acute distress.     Appearance: Normal appearance. He is obese.   HENT:      Head: Normocephalic and atraumatic.      Mouth/Throat:      Mouth: Mucous membranes are moist.   Eyes:      Pupils: Pupils are equal, round, and reactive to light.   Neck:      Vascular: No JVD.   Cardiovascular:      Rate and Rhythm: Normal rate and regular rhythm.      Pulses: Normal pulses.      Heart sounds: Normal heart sounds. No murmur heard.     No friction rub. No gallop.   Pulmonary:      Effort: Pulmonary effort is normal.      Breath sounds: Normal breath sounds. No wheezing or rhonchi.   Abdominal:      General: Bowel sounds are normal. There is no distension.      Palpations: Abdomen is soft.   Musculoskeletal:         General: No swelling.      Right lower leg: No edema.      Left lower leg: No edema.   Skin:     General: Skin is warm and dry.      Comments: Right radial site is soft, without evidence of hematoma   Neurological:      General: No focal deficit present.      Mental Status: He is alert and oriented to person, place, and time. Mental status is at baseline.   Psychiatric:         Mood and Affect: Mood and affect normal.         Behavior: Behavior normal.         Thought Content: Thought content normal.         Cognition and Memory: Memory normal.         Judgment: Judgment normal.               Labs (personally reviewed):     Estimated Creatinine Clearance: 134.8 mL/min (by C-G formula based on SCr of 0.67 mg/dL).    No results found for: \"BNPBTYPENAT\"  Recent Labs     05/19/24  2345 05/20/24  0751 05/21/24  0409   CREATININE 0.92 0.67 0.67   BUN 13 12 9   POTASSIUM 4.0 4.1 4.1   SODIUM 141 137 138   CALCIUM 9.4 9.4 8.9   MAGNESIUM  " --  1.8 1.9   CO2 23 20 22   ALBUMIN 4.3 4.2  --      Recent Labs     05/19/24 2345 05/20/24  0751 05/21/24  0409   GLUCOSE 191* 170* 153*     Recent Labs     05/19/24 2345 05/20/24  0251   ASTSGOT 44  --    ALTSGPT 43  --    ALKPHOSPHAT 88  --    INR  --  1.21*     Recent Labs     05/19/24 2345 05/20/24  0751 05/21/24  0409   WBC 12.0* 16.0* 15.5*   HEMOGLOBIN 16.5 16.0 16.1   PLATELETCT 222 219 220     Recent Labs     05/19/24 2345 05/20/24  0204 05/20/24  0751   TROPONINT 22* 219* 3470*   NTPROBNP  --   --  309*   HBA1C  --   --  6.4*     Lab Results   Component Value Date/Time    CHOLSTRLTOT 271 (H) 05/20/2024 07:51 AM     (H) 05/20/2024 07:51 AM    HDL 45 05/20/2024 07:51 AM    TRIGLYCERIDE 133 05/20/2024 07:51 AM       Imaging:  EC-ECHOCARDIOGRAM COMPLETE W/ CONT   Final Result      CT-CTA COMPLETE THORACOABDOMINAL AORTA   Final Result         1.  No aortic aneurysm or dissection identified.         DX-CHEST-PORTABLE (1 VIEW)   Final Result         1.  No acute cardiopulmonary disease.      CL-LEFT HEART CATHETERIZATION WITH POSSIBLE INTERVENTION    (Results Pending)       Cardiac Imaging and Procedures Review      Echo 5/20/2024  Echocardiography Laboratory     CONCLUSIONS  Mildly reduced left ventricular systolic function. The left ventricular   ejection fraction is visually estimated to be 45%.  Septal and apical   hypokinesis.   Mild concentric left ventricular hypertrophy. Grade I diastolic   dysfunction.  No significant valvular abnormalities.   No prior study is available for comparison.       [facility] 5/20/2024  Cardiac Catheterization report  INDICATION/PREOPERATIVE DIAGNOSIS:  High risk acute coronary syndrome  Ongoing symptoms despite medical therapy  Urgent/emergent coronary angiography     POSTOPERATIVE DIAGNOSIS:  99% subtotal thrombotic acutely occluded proximal LAD single-vessel disease  LVEF 45%, LVEDP 10 mmHg  Successful IVUS guided PCI proximal to mid LAD (4.0 x 30 mm [place] ROYCE  postdilated to 4.75 mm proximally), excellent result     RECOMMENDATIONS:  Guideline directed medical therapy   Cardiovascular Risk factor modification  Dual antiplatelet therapy for minimum 1 year     PROCEDURES PERFORMED:  Coronary arteriograms  Left heart catheterization and Left ventriculogram   Supervision moderate sedation  Percutaneous coronary intervention  Intravascular ultrasound LAD     FINDINGS:  I.  HEMODYNAMICS              Ao: 105/77 mmHg              LEDP: 10 mmHg              Gradient on LV pullback: No     II. CORONARY ANGIOGRAPHY:  Left main coronary artery: Large bifurcating no CAD  Left anterior descending artery: Large-caliber with an acute thrombotic proximal to mid 99% subtotal stenosis with FRANCIS I flow involving a diagonal and a Parson 1, 1, 0 configuration.  Post-PCI there is 0% residual stenosis and no sidebranch compromise, FRANCIS-3 flow.  Left circumflex coronary artery: Moderate caliber no CAD  Right coronary artery: Large-caliber dominant no CAD     III.  LEFT VENTRICULOGRAM:  LVEF BUSH PROJECTION: 45%    Principal Problem:    NSTEMI (non-ST elevated myocardial infarction) (HCC) (POA: Yes)  Active Problems:    GERD (gastroesophageal reflux disease) (POA: Yes)    Lumbar disc disease (POA: Yes)    Hyperglycemia (POA: Unknown)    Unstable angina (HCC) (POA: Unknown)    Class 3 severe obesity in adult (HCC) (POA: Unknown)    Former heavy tobacco smoker (POA: Unknown)  Resolved Problems:    * No resolved hospital problems. *      Assessment and Medical Decision Making:    # NSTEMI    -ASA  -Prasugrel 10 mg daily  -[facility] 5/20/2024 -  found to have 99% subtotal thrombotic acutely occluded proximal LAD.  He was successfully treated with a stent to proximal to mid LAD (4.0 x 30 mm [place] ROYCE).  -DAPT for at least 12 months  -Mildly reduced LV function with EF 45%  -Given A1c 6.4 and EF 45%, - Farxiga 10 mg daily  -HI Statin - atorvastatin 80 mg every evening for goal LDL less than 70  -BB -continue  Toprol-XL 50 milligram daily  -Continue losartan 50 mg daily    -Meds-to-beds on discharge  -Cardiac rehab referral placed    Education   -[facility] site care instructions provided  -Discussed returning to ER if chest pain returns and/or call cardiology office at [phone] with any additional new or worsening symptoms  -Discussed CV risk factor modification including cholesterol management, blood pressure management, smoking cessation, diet and lifestyle changes.     # Obesity  # Tobacco abuse   quit 01/20/2024.  Recommend ongoing cessation    # A1c 6.4, hyperglycemia   Continue farxiga 10 mg daily          Future Appointments   Date Time Provider Department Center   6/13/2024  3:45 PM Redet ELY Schmitt [facility] None        Cardiology will sign off    I personally discussed his case with  Dr Cole    Please contact me with any questions.  Thank you for allowing us to participate in the care of Mr. Tesfaye.        Please note this dictation was created using voice recognition software.  I have made every reasonable attempt to correct obvious errors, but there may be errors of grammar and possibly content that I did not discover before finalizing the note.    HANH Cabrera.  [facility]  [phone]

## 2024-05-22 NOTE — PROGRESS NOTES
Patient arrived to discharge lounge via wheelchair. RN removed dressing and went over discharge instructions including new medications. Medications delivered to discharge lounge and given to patient. All patient's questions were answered and patient left via wheelchair with son.

## 2024-05-22 NOTE — PROGRESS NOTES
Monitor Summary:    Rhythm: SR  Rate: 63-81  Ectopy: (R) PVCs, (R) PACs  0.16/ 0.09/ 0.37

## 2024-05-22 NOTE — DISCHARGE INSTRUCTIONS
Discharge Instructions per Anuradha Cole M.D.    Please follow-up with cardiology as outpatient  Continue taking medications as prescribed  No smoking  Follow up with PCP in one week    Return to ER in the event of new or worsening symptoms. Please note importance of compliance and the patient has agreed to proceed with all medical recommendations and follow up plan indicated above. All medications come with benefits and risks. Risks may include permanent injury or death and these risks can be minimized with close reassessment and monitoring. Please make it to your scheduled follow ups with PCP and cardiology

## 2024-06-19 ENCOUNTER — TELEPHONE (OUTPATIENT)
Dept: CARDIOLOGY | Facility: MEDICAL CENTER | Age: 69
End: 2024-06-19
Payer: COMMERCIAL

## 2024-06-19 ENCOUNTER — APPOINTMENT (OUTPATIENT)
Dept: CARDIOLOGY | Facility: MEDICAL CENTER | Age: 69
End: 2024-06-19
Attending: STUDENT IN AN ORGANIZED HEALTH CARE EDUCATION/TRAINING PROGRAM
Payer: COMMERCIAL

## 2024-06-20 ENCOUNTER — OFFICE VISIT (OUTPATIENT)
Dept: CARDIOLOGY | Facility: MEDICAL CENTER | Age: 69
End: 2024-06-20
Attending: NURSE PRACTITIONER
Payer: COMMERCIAL

## 2024-06-20 VITALS
HEART RATE: 80 BPM | WEIGHT: 251 LBS | SYSTOLIC BLOOD PRESSURE: 110 MMHG | HEIGHT: 66 IN | OXYGEN SATURATION: 94 % | RESPIRATION RATE: 16 BRPM | BODY MASS INDEX: 40.34 KG/M2 | DIASTOLIC BLOOD PRESSURE: 60 MMHG

## 2024-06-20 DIAGNOSIS — Z95.5 S/P DRUG ELUTING CORONARY STENT PLACEMENT: ICD-10-CM

## 2024-06-20 DIAGNOSIS — I25.5 ISCHEMIC CARDIOMYOPATHY: ICD-10-CM

## 2024-06-20 DIAGNOSIS — I21.4 NSTEMI (NON-ST ELEVATED MYOCARDIAL INFARCTION) (HCC): ICD-10-CM

## 2024-06-20 DIAGNOSIS — Z79.899 HIGH RISK MEDICATION USE: ICD-10-CM

## 2024-06-20 DIAGNOSIS — I25.10 CORONARY ARTERY DISEASE INVOLVING NATIVE CORONARY ARTERY OF NATIVE HEART WITHOUT ANGINA PECTORIS: ICD-10-CM

## 2024-06-20 PROCEDURE — 99212 OFFICE O/P EST SF 10 MIN: CPT | Performed by: NURSE PRACTITIONER

## 2024-06-20 PROCEDURE — 3078F DIAST BP <80 MM HG: CPT | Performed by: NURSE PRACTITIONER

## 2024-06-20 PROCEDURE — 3074F SYST BP LT 130 MM HG: CPT | Performed by: NURSE PRACTITIONER

## 2024-06-20 PROCEDURE — 99214 OFFICE O/P EST MOD 30 MIN: CPT | Performed by: NURSE PRACTITIONER

## 2024-06-20 RX ORDER — ASPIRIN 81 MG/1
81 TABLET ORAL DAILY
Qty: 100 TABLET | Refills: 3 | Status: SHIPPED | OUTPATIENT
Start: 2024-06-20

## 2024-06-20 RX ORDER — ATORVASTATIN CALCIUM 80 MG/1
80 TABLET, FILM COATED ORAL EVERY EVENING
Qty: 90 TABLET | Refills: 3 | Status: SHIPPED | OUTPATIENT
Start: 2024-06-20

## 2024-06-20 RX ORDER — LOSARTAN POTASSIUM 50 MG/1
50 TABLET ORAL EVERY EVENING
Qty: 90 TABLET | Refills: 3 | Status: SHIPPED | OUTPATIENT
Start: 2024-06-20

## 2024-06-20 RX ORDER — PRASUGREL 10 MG/1
10 TABLET, FILM COATED ORAL DAILY
Qty: 90 TABLET | Refills: 3 | Status: SHIPPED | OUTPATIENT
Start: 2024-06-20

## 2024-06-20 RX ORDER — DAPAGLIFLOZIN 10 MG/1
10 TABLET, FILM COATED ORAL DAILY
Qty: 90 TABLET | Refills: 3 | Status: SHIPPED | OUTPATIENT
Start: 2024-06-20

## 2024-06-20 RX ORDER — METOPROLOL SUCCINATE 50 MG/1
50 TABLET, EXTENDED RELEASE ORAL DAILY
Qty: 90 TABLET | Refills: 3 | Status: SHIPPED | OUTPATIENT
Start: 2024-06-20

## 2024-06-20 ASSESSMENT — ENCOUNTER SYMPTOMS
PALPITATIONS: 0
CLAUDICATION: 0
PND: 0
FEVER: 0
SHORTNESS OF BREATH: 0
MYALGIAS: 0
ABDOMINAL PAIN: 0
COUGH: 0
ORTHOPNEA: 0
DIZZINESS: 0

## 2024-06-20 ASSESSMENT — FIBROSIS 4 INDEX: FIB4 SCORE: 2.23

## 2024-06-20 NOTE — PROGRESS NOTES
Chief Complaint   Patient presents with    MI (Non ST Segment Elevation MI)       Subjective     Ray Tesfaye is a 68 y.o. male who presents today for follow-up after his recent hospitalization with his wife, Aarti.    New patient of the office.  Patient presented to the hospital on 5/20/2024 with chest pain.  Patient's initial EKG was concerning for STEMI, but was evaluated by cardiology and did not meet criteria of ST elevation.  Patient did show evidence of coronary ischemia, CT angiogram did not show any evidence of aortic dissection.  He was started on heparin drip and underwent of left heart cath showing 99% subtotal thrombotic acutely occluded proximal LAD and received successful IVUS guided PCI proximal to mid LAD with ROYCE x 1.  Patient did have an echocardiogram with a EF of 45%.  He was discharged home on DAPT and GDMT and recommended follow-up with outpatient cardiology.    Patient reports he has been doing well.  He denies any chest pain, shortness of breath, palpitations, orthopnea, PND, edema or dizziness/lightheadedness.    Patient has been doing some exercises at home, but does state he has knee problems.    He reports having a smoking history, he has smoked off-and-on.  He reports he was previously smoking about 8 cigarettes/day and quit this last January.    Patient has some other medical history of dyslipidemia, GERD, obesity.    Past Medical History:   Diagnosis Date    Arthritis     ED (erectile dysfunction)     GERD (gastroesophageal reflux disease) 12/27/2011    Osteoarthritis of lumbar spine 12/27/2011     Past Surgical History:   Procedure Laterality Date    KNEE ARTHROSCOPY  11/3/2011    Performed by PRIMO ROSA at SURGERY [facility] ORS    MENISCECTOMY, KNEE, MEDIAL  11/3/2011    Performed by PRIMO ROSA at [facility] ORS     Family History   Problem Relation Age of Onset    Stroke Mother      Social History     Socioeconomic History    Marital status:       Spouse name: Not on file    Number of children: Not on file    Years of education: Not on file    Highest education level: Not on file   Occupational History    Occupation:      Employer: [email or web address]   Tobacco Use    Smoking status: Former     Current packs/day: 0.00     Average packs/day: 0.3 packs/day for 4.0 years (1.0 ttl pk-yrs)     Types: Cigarettes     Start date: 2020     Quit date: 2024     Years since quittin.4    Smokeless tobacco: Never   Vaping Use    Vaping status: Never Used   Substance and Sexual Activity    Alcohol use: Never     Comment: Rare useage    Drug use: No    Sexual activity: Never     Partners: Female   Other Topics Concern    Not on file   Social History Narrative    Not on file     Social Determinants of Health     Financial Resource Strain: Not on file   Food Insecurity: Not on file   Transportation Needs: Not on file   Physical Activity: Not on file   Stress: Not on file   Social Connections: Not on file   Intimate Partner Violence: Not At Risk (2024)    Humiliation, Afraid, Rape, and Kick questionnaire     Fear of Current or Ex-Partner: No     Emotionally Abused: No     Physically Abused: No     Sexually Abused: No   Housing Stability: Not on file     No Known Allergies  Outpatient Encounter Medications as of 2024   Medication Sig Dispense Refill    aspirin 81 MG EC tablet Take 1 Tablet by mouth every day. 100 Tablet 3    atorvastatin (LIPITOR) 80 MG tablet Take 1 Tablet by mouth every evening. 90 Tablet 3    FARXIGA 10 MG Tab Take 1 Tablet by mouth every day. 90 Tablet 3    metoprolol SR (TOPROL XL) 50 MG TABLET SR 24 HR Take 1 Tablet by mouth every day. 90 Tablet 3    prasugrel (EFFIENT) 10 MG Tab Take 1 Tablet by mouth every day. 90 Tablet 3    losartan (COZAAR) 50 MG Tab Take 1 Tablet by mouth every evening. 90 Tablet 3    tamsulosin (FLOMAX) 0.4 MG capsule Take 0.4 mg by mouth every day.      [DISCONTINUED] aspirin 81 MG EC tablet Take 1 Tablet by  "mouth every day. 100 Tablet 0    [DISCONTINUED] atorvastatin (LIPITOR) 80 MG tablet Take 1 Tablet by mouth every evening. 30 Tablet 0    [DISCONTINUED] dapagliflozin propanediol (FARXIGA) 10 MG Tab Take 1 Tablet by mouth every day. 30 Tablet 0    [DISCONTINUED] losartan (COZAAR) 50 MG Tab Take 1 Tablet by mouth every evening. 30 Tablet 0    [DISCONTINUED] metoprolol SR (TOPROL XL) 50 MG TABLET SR 24 HR Take 1 Tablet by mouth every day. 30 Tablet 0    [DISCONTINUED] prasugrel (EFFIENT) 10 MG Tab Take 1 Tablet by mouth every day. 30 Tablet 0     No facility-administered encounter medications on file as of 6/20/2024.     Review of Systems   Constitutional:  Negative for fever and malaise/fatigue.   Respiratory:  Negative for cough and shortness of breath.    Cardiovascular:  Negative for chest pain, palpitations, orthopnea, claudication, leg swelling and PND.   Gastrointestinal:  Negative for abdominal pain.   Musculoskeletal:  Positive for joint pain (knee). Negative for myalgias.   Neurological:  Negative for dizziness.   All other systems reviewed and are negative.             Objective     /60 (BP Location: Left arm, Patient Position: Sitting, BP Cuff Size: Adult)   Pulse 80   Resp 16   Ht 1.676 m (5' 6\")   Wt 114 kg (251 lb)   SpO2 94%   BMI 40.51 kg/m²     Physical Exam  Vitals reviewed.   Constitutional:       Appearance: He is well-developed.   HENT:      Head: Normocephalic and atraumatic.   Eyes:      Pupils: Pupils are equal, round, and reactive to light.   Neck:      Vascular: No JVD.   Cardiovascular:      Rate and Rhythm: Normal rate and regular rhythm.      Heart sounds: Normal heart sounds.   Pulmonary:      Effort: Pulmonary effort is normal. No respiratory distress.      Breath sounds: Normal breath sounds. No wheezing or rales.   Abdominal:      General: Bowel sounds are normal.      Palpations: Abdomen is soft.   Musculoskeletal:         General: Normal range of motion.      Cervical " back: Normal range of motion and neck supple.      Right lower leg: No edema.      Left lower leg: No edema.   Skin:     General: Skin is warm and dry.   Neurological:      General: No focal deficit present.      Mental Status: He is alert and oriented to person, place, and time.   Psychiatric:         Behavior: Behavior normal.       Lab Results   Component Value Date/Time    CHOLSTRLTOT 271 (H) 05/20/2024 07:51 AM     (H) 05/20/2024 07:51 AM    HDL 45 05/20/2024 07:51 AM    TRIGLYCERIDE 133 05/20/2024 07:51 AM       Lab Results   Component Value Date/Time    SODIUM 136 05/22/2024 09:01 AM    POTASSIUM 4.2 05/22/2024 09:01 AM    CHLORIDE 99 05/22/2024 09:01 AM    CO2 24 05/22/2024 09:01 AM    GLUCOSE 116 (H) 05/22/2024 09:01 AM    BUN 11 05/22/2024 09:01 AM    CREATININE 0.85 05/22/2024 09:01 AM     Lab Results   Component Value Date/Time    ALKPHOSPHAT 88 05/19/2024 11:45 PM    ASTSGOT 44 05/19/2024 11:45 PM    ALTSGPT 43 05/19/2024 11:45 PM    TBILIRUBIN 0.4 05/19/2024 11:45 PM      Cardiac Catheterization report 5/20/2024  REFERRING MD: Dr. Soares     INDICATION/PREOPERATIVE DIAGNOSIS:  High risk acute coronary syndrome  Ongoing symptoms despite medical therapy  Urgent/emergent coronary angiography     POSTOPERATIVE DIAGNOSIS:  99% subtotal thrombotic acutely occluded proximal LAD single-vessel disease  LVEF 45%, LVEDP 10 mmHg  Successful IVUS guided PCI proximal to mid LAD (4.0 x 30 mm [place] ROYCE postdilated to 4.75 mm proximally), excellent result     RECOMMENDATIONS:  Guideline directed medical therapy   Cardiovascular Risk factor modification  Dual antiplatelet therapy for minimum 1 year        PROCEDURES PERFORMED:  Coronary arteriograms  Left heart catheterization and Left ventriculogram   Supervision moderate sedation  Percutaneous coronary intervention  Intravascular ultrasound LAD        FINDINGS:  I.  HEMODYNAMICS              Ao: 105/77 mmHg              LEDP: 10 mmHg              Gradient on LV  pullback: No     II. CORONARY ANGIOGRAPHY:  Left main coronary artery: Large bifurcating no CAD  Left anterior descending artery: Large-caliber with an acute thrombotic proximal to mid 99% subtotal stenosis with FRANCIS I flow involving a diagonal and a Parson 1, 1, 0 configuration.  Post-PCI there is 0% residual stenosis and no sidebranch compromise, FRANCIS-3 flow.  Left circumflex coronary artery: Moderate caliber no CAD  Right coronary artery: Large-caliber dominant no CAD     III.  LEFT VENTRICULOGRAM:  LVEF BUSH PROJECTION: 45%         Transthoracic Echo Report 5/20/2024  Mildly reduced left ventricular systolic function. The left ventricular   ejection fraction is visually estimated to be 45%.  Septal and apical   hypokinesis.   Mild concentric left ventricular hypertrophy. Grade I diastolic   dysfunction.  No significant valvular abnormalities.   No prior study is available for comparison.            Assessment & Plan     1. NSTEMI (non-ST elevated myocardial infarction) ([facility])  aspirin 81 MG EC tablet    atorvastatin (LIPITOR) 80 MG tablet    FARXIGA 10 MG Tab    metoprolol SR (TOPROL XL) 50 MG TABLET SR 24 HR    prasugrel (EFFIENT) 10 MG Tab    losartan (COZAAR) 50 MG Tab      2. Coronary artery disease involving native coronary artery of native heart without angina pectoris  FARXIGA 10 MG Tab      3. Ischemic cardiomyopathy        4. High risk medication use        5. S/P drug eluting coronary stent placement            Medical Decision Making: Today's Assessment/Status/Plan:        CAD, s/p PCI/ROYCE x 1 to the LAD on 5/20/2024:  -Continue aspirin 81 mg daily  -Continue Effient 10 mg daily  -Continue Farxiga 10 mg daily, patient mention coverage problems, medication reordered as brand  -Continue atorvastatin 80 mg daily  -Recommend cardiac rehab, patient is hesitant, encourage patient to contact cardiac rehab and at least attend an orientation    Ischemic cardiomyopathy, LVEF 45%:  -Continue losartan 50 mg  daily  -Continue metoprolol SR 50 mg daily  -Continue Farxiga 10 mg daily  -Reinforced s/sx of worsening heart failure with patient. Pt verbalizes understanding. Pt to call office or RTC if present.      Tobacco use:  -Encouraged continued smoking cessation    FU in clinic in 3 months with Dr. Soares. Sooner if needed.    Patient verbalizes understanding and agrees with the plan of care.     PLEASE NOTE: This Note was created using voice recognition Software. I have made every reasonable attempt to correct obvious errors, but I expect that there are errors of grammar and possibly content that I did not discover before finalizing the note

## 2024-06-24 ENCOUNTER — TELEPHONE (OUTPATIENT)
Dept: CARDIOLOGY | Facility: MEDICAL CENTER | Age: 69
End: 2024-06-24
Payer: COMMERCIAL

## 2024-06-24 NOTE — TELEPHONE ENCOUNTER
Prior Authorization for FARXIGA 10MG TAB (Quantity: 90, Days: 90) has been submitted via Cover My Meds: Key ([id number])    Insurance: [employer]    Will follow up in 24-48 business hours.

## 2024-06-24 NOTE — TELEPHONE ENCOUNTER
Received [employer] request via [employer]  for FARXIGA 10MG TAB. (Quantity:90, Day Supply:90)     Insurance: [employer]  Member ID:  [id number]  BIN: [id number]  PCN: [id number]  Group: [id number]     Ran Test claim via [employer] & medication Rejects stating prior authorization is required.

## 2024-06-24 NOTE — TELEPHONE ENCOUNTER
Prior Authorization for FARXIGA 10MG TAB has been approved for a quantity of 90 , day supply 90    Prior Authorization reference number: [id number]  Insurance: [facility]  Effective dates: 6/24/24-6/24/25  Copay: $15     Is patient eligible to fill with [facility]? Yes    Next Steps:  RX HAS ALREADY BEEN RELEASED TO [facility] $15/90